# Patient Record
Sex: FEMALE | Race: WHITE | Employment: UNEMPLOYED | URBAN - METROPOLITAN AREA
[De-identification: names, ages, dates, MRNs, and addresses within clinical notes are randomized per-mention and may not be internally consistent; named-entity substitution may affect disease eponyms.]

---

## 2018-09-21 ENCOUNTER — APPOINTMENT (OUTPATIENT)
Dept: GENERAL RADIOLOGY | Age: 83
DRG: 683 | End: 2018-09-21
Attending: EMERGENCY MEDICINE
Payer: MEDICARE

## 2018-09-21 ENCOUNTER — HOSPITAL ENCOUNTER (INPATIENT)
Age: 83
LOS: 5 days | Discharge: NURSING, REHAB OR LONG-TERM CARE | DRG: 683 | End: 2018-09-26
Attending: EMERGENCY MEDICINE | Admitting: INTERNAL MEDICINE
Payer: MEDICARE

## 2018-09-21 DIAGNOSIS — S42.125A CLOSED NONDISPLACED FRACTURE OF LEFT ACROMIAL PROCESS, INITIAL ENCOUNTER: ICD-10-CM

## 2018-09-21 DIAGNOSIS — I87.2 VENOUS STASIS DERMATITIS OF BOTH LOWER EXTREMITIES: ICD-10-CM

## 2018-09-21 DIAGNOSIS — S70.01XA CONTUSION OF RIGHT HIP, INITIAL ENCOUNTER: ICD-10-CM

## 2018-09-21 DIAGNOSIS — N30.01 ACUTE CYSTITIS WITH HEMATURIA: Primary | ICD-10-CM

## 2018-09-21 DIAGNOSIS — N17.9 AKI (ACUTE KIDNEY INJURY) (HCC): ICD-10-CM

## 2018-09-21 DIAGNOSIS — S22.31XA CLOSED FRACTURE OF ONE RIB OF RIGHT SIDE, INITIAL ENCOUNTER: ICD-10-CM

## 2018-09-21 PROBLEM — N39.0 UTI (URINARY TRACT INFECTION): Status: ACTIVE | Noted: 2018-09-21

## 2018-09-21 PROBLEM — S22.39XA RIB FRACTURE: Status: ACTIVE | Noted: 2018-09-21

## 2018-09-21 PROBLEM — S42.123A: Status: ACTIVE | Noted: 2018-09-21

## 2018-09-21 PROBLEM — A41.9 SEPSIS (HCC): Status: ACTIVE | Noted: 2018-09-21

## 2018-09-21 PROBLEM — T81.40XA POST OP INFECTION: Status: RESOLVED | Noted: 2018-09-21 | Resolved: 2018-09-21

## 2018-09-21 PROBLEM — T81.40XA POST OP INFECTION: Status: ACTIVE | Noted: 2018-09-21

## 2018-09-21 LAB
ALBUMIN SERPL-MCNC: 2.7 G/DL (ref 3.4–5)
ALBUMIN/GLOB SERPL: 0.7 {RATIO} (ref 0.8–1.7)
ALP SERPL-CCNC: 95 U/L (ref 45–117)
ALT SERPL-CCNC: 37 U/L (ref 13–56)
ANION GAP SERPL CALC-SCNC: 5 MMOL/L (ref 3–18)
APPEARANCE UR: ABNORMAL
APTT PPP: 24.8 SEC (ref 23–36.4)
AST SERPL-CCNC: 40 U/L (ref 15–37)
BACTERIA URNS QL MICRO: ABNORMAL /HPF
BASOPHILS # BLD: 0 K/UL (ref 0–0.1)
BASOPHILS NFR BLD: 0 % (ref 0–2)
BILIRUB SERPL-MCNC: 0.7 MG/DL (ref 0.2–1)
BILIRUB UR QL: NEGATIVE
BNP SERPL-MCNC: 1000 PG/ML (ref 0–1800)
BUN SERPL-MCNC: 81 MG/DL (ref 7–18)
BUN/CREAT SERPL: 33 (ref 12–20)
CALCIUM SERPL-MCNC: 8.9 MG/DL (ref 8.5–10.1)
CHLORIDE SERPL-SCNC: 95 MMOL/L (ref 100–108)
CK MB CFR SERPL CALC: 1 % (ref 0–4)
CK MB SERPL-MCNC: 3.2 NG/ML (ref 5–25)
CK SERPL-CCNC: 309 U/L (ref 26–192)
CO2 SERPL-SCNC: 34 MMOL/L (ref 21–32)
COLOR UR: YELLOW
CREAT SERPL-MCNC: 2.45 MG/DL (ref 0.6–1.3)
DIFFERENTIAL METHOD BLD: ABNORMAL
EOSINOPHIL # BLD: 0.2 K/UL (ref 0–0.4)
EOSINOPHIL NFR BLD: 2 % (ref 0–5)
EPITH CASTS URNS QL MICRO: ABNORMAL /LPF (ref 0–5)
ERYTHROCYTE [DISTWIDTH] IN BLOOD BY AUTOMATED COUNT: 13.4 % (ref 11.6–14.5)
EST. AVERAGE GLUCOSE BLD GHB EST-MCNC: 232 MG/DL
GLOBULIN SER CALC-MCNC: 4.1 G/DL (ref 2–4)
GLUCOSE BLD STRIP.AUTO-MCNC: 422 MG/DL (ref 70–110)
GLUCOSE BLD STRIP.AUTO-MCNC: 504 MG/DL (ref 70–110)
GLUCOSE SERPL-MCNC: 339 MG/DL (ref 74–99)
GLUCOSE UR STRIP.AUTO-MCNC: NEGATIVE MG/DL
HBA1C MFR BLD: 9.7 % (ref 4.5–5.6)
HCT VFR BLD AUTO: 41.3 % (ref 35–45)
HGB BLD-MCNC: 13.3 G/DL (ref 12–16)
HGB UR QL STRIP: ABNORMAL
INR PPP: 1.1 (ref 0.8–1.2)
KETONES UR QL STRIP.AUTO: NEGATIVE MG/DL
LEUKOCYTE ESTERASE UR QL STRIP.AUTO: ABNORMAL
LIPASE SERPL-CCNC: 165 U/L (ref 73–393)
LYMPHOCYTES # BLD: 1.3 K/UL (ref 0.9–3.6)
LYMPHOCYTES NFR BLD: 12 % (ref 21–52)
MAGNESIUM SERPL-MCNC: 2.5 MG/DL (ref 1.6–2.6)
MAGNESIUM SERPL-MCNC: 2.6 MG/DL (ref 1.6–2.6)
MCH RBC QN AUTO: 29.2 PG (ref 24–34)
MCHC RBC AUTO-ENTMCNC: 32.2 G/DL (ref 31–37)
MCV RBC AUTO: 90.6 FL (ref 74–97)
MONOCYTES # BLD: 1.4 K/UL (ref 0.05–1.2)
MONOCYTES NFR BLD: 14 % (ref 3–10)
NEUTS SEG # BLD: 7.7 K/UL (ref 1.8–8)
NEUTS SEG NFR BLD: 72 % (ref 40–73)
NITRITE UR QL STRIP.AUTO: NEGATIVE
PH UR STRIP: 5 [PH] (ref 5–8)
PHOSPHATE SERPL-MCNC: 3.2 MG/DL (ref 2.5–4.9)
PLATELET # BLD AUTO: 186 K/UL (ref 135–420)
PMV BLD AUTO: 10.5 FL (ref 9.2–11.8)
POTASSIUM SERPL-SCNC: 4.2 MMOL/L (ref 3.5–5.5)
PROT SERPL-MCNC: 6.8 G/DL (ref 6.4–8.2)
PROT UR STRIP-MCNC: 300 MG/DL
PROTHROMBIN TIME: 13.4 SEC (ref 11.5–15.2)
RBC # BLD AUTO: 4.56 M/UL (ref 4.2–5.3)
RBC #/AREA URNS HPF: ABNORMAL /HPF (ref 0–5)
SODIUM SERPL-SCNC: 134 MMOL/L (ref 136–145)
SP GR UR REFRACTOMETRY: 1.02 (ref 1–1.03)
TROPONIN I SERPL-MCNC: 0.02 NG/ML (ref 0–0.06)
UROBILINOGEN UR QL STRIP.AUTO: 0.2 EU/DL (ref 0.2–1)
WBC # BLD AUTO: 10.5 K/UL (ref 4.6–13.2)
WBC URNS QL MICRO: ABNORMAL /HPF (ref 0–5)

## 2018-09-21 PROCEDURE — 87040 BLOOD CULTURE FOR BACTERIA: CPT | Performed by: INTERNAL MEDICINE

## 2018-09-21 PROCEDURE — 87077 CULTURE AEROBIC IDENTIFY: CPT | Performed by: INTERNAL MEDICINE

## 2018-09-21 PROCEDURE — 96375 TX/PRO/DX INJ NEW DRUG ADDON: CPT

## 2018-09-21 PROCEDURE — 85730 THROMBOPLASTIN TIME PARTIAL: CPT | Performed by: EMERGENCY MEDICINE

## 2018-09-21 PROCEDURE — 82550 ASSAY OF CK (CPK): CPT | Performed by: EMERGENCY MEDICINE

## 2018-09-21 PROCEDURE — 85025 COMPLETE CBC W/AUTO DIFF WBC: CPT | Performed by: EMERGENCY MEDICINE

## 2018-09-21 PROCEDURE — 74011000250 HC RX REV CODE- 250: Performed by: INTERNAL MEDICINE

## 2018-09-21 PROCEDURE — 73030 X-RAY EXAM OF SHOULDER: CPT

## 2018-09-21 PROCEDURE — 80053 COMPREHEN METABOLIC PANEL: CPT | Performed by: EMERGENCY MEDICINE

## 2018-09-21 PROCEDURE — 83690 ASSAY OF LIPASE: CPT | Performed by: EMERGENCY MEDICINE

## 2018-09-21 PROCEDURE — 87186 SC STD MICRODIL/AGAR DIL: CPT | Performed by: INTERNAL MEDICINE

## 2018-09-21 PROCEDURE — 73502 X-RAY EXAM HIP UNI 2-3 VIEWS: CPT

## 2018-09-21 PROCEDURE — 74011000250 HC RX REV CODE- 250: Performed by: EMERGENCY MEDICINE

## 2018-09-21 PROCEDURE — 82962 GLUCOSE BLOOD TEST: CPT

## 2018-09-21 PROCEDURE — 74011250637 HC RX REV CODE- 250/637: Performed by: INTERNAL MEDICINE

## 2018-09-21 PROCEDURE — 71111 X-RAY EXAM RIBS/CHEST4/> VWS: CPT

## 2018-09-21 PROCEDURE — 83880 ASSAY OF NATRIURETIC PEPTIDE: CPT | Performed by: EMERGENCY MEDICINE

## 2018-09-21 PROCEDURE — 85610 PROTHROMBIN TIME: CPT | Performed by: EMERGENCY MEDICINE

## 2018-09-21 PROCEDURE — 65270000029 HC RM PRIVATE

## 2018-09-21 PROCEDURE — 83036 HEMOGLOBIN GLYCOSYLATED A1C: CPT | Performed by: INTERNAL MEDICINE

## 2018-09-21 PROCEDURE — 99285 EMERGENCY DEPT VISIT HI MDM: CPT

## 2018-09-21 PROCEDURE — 74011250636 HC RX REV CODE- 250/636: Performed by: EMERGENCY MEDICINE

## 2018-09-21 PROCEDURE — 83735 ASSAY OF MAGNESIUM: CPT | Performed by: INTERNAL MEDICINE

## 2018-09-21 PROCEDURE — 77030011943

## 2018-09-21 PROCEDURE — 83735 ASSAY OF MAGNESIUM: CPT | Performed by: EMERGENCY MEDICINE

## 2018-09-21 PROCEDURE — 84100 ASSAY OF PHOSPHORUS: CPT | Performed by: INTERNAL MEDICINE

## 2018-09-21 PROCEDURE — 87086 URINE CULTURE/COLONY COUNT: CPT | Performed by: INTERNAL MEDICINE

## 2018-09-21 PROCEDURE — 81001 URINALYSIS AUTO W/SCOPE: CPT | Performed by: EMERGENCY MEDICINE

## 2018-09-21 PROCEDURE — 93005 ELECTROCARDIOGRAM TRACING: CPT

## 2018-09-21 PROCEDURE — 74011250636 HC RX REV CODE- 250/636: Performed by: INTERNAL MEDICINE

## 2018-09-21 PROCEDURE — 96374 THER/PROPH/DIAG INJ IV PUSH: CPT

## 2018-09-21 PROCEDURE — 74011636637 HC RX REV CODE- 636/637: Performed by: INTERNAL MEDICINE

## 2018-09-21 RX ORDER — HYDROCHLOROTHIAZIDE 12.5 MG/1
25 TABLET ORAL DAILY
COMMUNITY
End: 2018-09-26

## 2018-09-21 RX ORDER — LABETALOL HYDROCHLORIDE 5 MG/ML
10 INJECTION, SOLUTION INTRAVENOUS
Status: DISCONTINUED | OUTPATIENT
Start: 2018-09-21 | End: 2018-09-26 | Stop reason: HOSPADM

## 2018-09-21 RX ORDER — HYDRALAZINE HYDROCHLORIDE 10 MG/1
TABLET, FILM COATED ORAL
Status: ON HOLD | COMMUNITY
End: 2018-09-25

## 2018-09-21 RX ORDER — HEPARIN SODIUM 5000 [USP'U]/ML
5000 INJECTION, SOLUTION INTRAVENOUS; SUBCUTANEOUS EVERY 8 HOURS
Status: DISCONTINUED | OUTPATIENT
Start: 2018-09-21 | End: 2018-09-26 | Stop reason: HOSPADM

## 2018-09-21 RX ORDER — MAGNESIUM SULFATE 100 %
4 CRYSTALS MISCELLANEOUS AS NEEDED
Status: DISCONTINUED | OUTPATIENT
Start: 2018-09-21 | End: 2018-09-26 | Stop reason: HOSPADM

## 2018-09-21 RX ORDER — CLONIDINE 0.2 MG/24H
1 PATCH, EXTENDED RELEASE TRANSDERMAL
Status: ON HOLD | COMMUNITY
End: 2018-09-25

## 2018-09-21 RX ORDER — INSULIN LISPRO 100 [IU]/ML
INJECTION, SOLUTION INTRAVENOUS; SUBCUTANEOUS
Status: DISCONTINUED | OUTPATIENT
Start: 2018-09-21 | End: 2018-09-26 | Stop reason: HOSPADM

## 2018-09-21 RX ORDER — HYDRALAZINE HYDROCHLORIDE 20 MG/ML
10 INJECTION INTRAMUSCULAR; INTRAVENOUS
Status: COMPLETED | OUTPATIENT
Start: 2018-09-21 | End: 2018-09-21

## 2018-09-21 RX ORDER — MORPHINE SULFATE 2 MG/ML
2 INJECTION, SOLUTION INTRAMUSCULAR; INTRAVENOUS
Status: DISCONTINUED | OUTPATIENT
Start: 2018-09-21 | End: 2018-09-26 | Stop reason: HOSPADM

## 2018-09-21 RX ORDER — ATORVASTATIN CALCIUM 10 MG/1
TABLET, FILM COATED ORAL DAILY
COMMUNITY

## 2018-09-21 RX ORDER — ONDANSETRON 2 MG/ML
4 INJECTION INTRAMUSCULAR; INTRAVENOUS
Status: DISCONTINUED | OUTPATIENT
Start: 2018-09-21 | End: 2018-09-26 | Stop reason: HOSPADM

## 2018-09-21 RX ORDER — FUROSEMIDE 10 MG/ML
40 INJECTION INTRAMUSCULAR; INTRAVENOUS
Status: COMPLETED | OUTPATIENT
Start: 2018-09-21 | End: 2018-09-21

## 2018-09-21 RX ORDER — HYDRALAZINE HYDROCHLORIDE 20 MG/ML
20 INJECTION INTRAMUSCULAR; INTRAVENOUS
Status: COMPLETED | OUTPATIENT
Start: 2018-09-21 | End: 2018-09-21

## 2018-09-21 RX ORDER — METOPROLOL SUCCINATE 50 MG/1
50 TABLET, EXTENDED RELEASE ORAL DAILY
Status: DISCONTINUED | OUTPATIENT
Start: 2018-09-22 | End: 2018-09-26 | Stop reason: HOSPADM

## 2018-09-21 RX ORDER — HYDRALAZINE HYDROCHLORIDE 10 MG/1
10 TABLET, FILM COATED ORAL 3 TIMES DAILY
Status: DISCONTINUED | OUTPATIENT
Start: 2018-09-21 | End: 2018-09-22

## 2018-09-21 RX ORDER — FUROSEMIDE 20 MG/1
20 TABLET ORAL DAILY
COMMUNITY

## 2018-09-21 RX ORDER — SODIUM CHLORIDE 9 MG/ML
100 INJECTION, SOLUTION INTRAVENOUS CONTINUOUS
Status: DISPENSED | OUTPATIENT
Start: 2018-09-21 | End: 2018-09-22

## 2018-09-21 RX ORDER — ACETAMINOPHEN 325 MG/1
650 TABLET ORAL 4 TIMES DAILY
Status: DISCONTINUED | OUTPATIENT
Start: 2018-09-21 | End: 2018-09-26 | Stop reason: HOSPADM

## 2018-09-21 RX ORDER — TRAMADOL HYDROCHLORIDE 50 MG/1
50 TABLET ORAL
Status: DISCONTINUED | OUTPATIENT
Start: 2018-09-21 | End: 2018-09-24

## 2018-09-21 RX ORDER — DEXTROSE 50 % IN WATER (D50W) INTRAVENOUS SYRINGE
25-50 AS NEEDED
Status: DISCONTINUED | OUTPATIENT
Start: 2018-09-21 | End: 2018-09-26 | Stop reason: HOSPADM

## 2018-09-21 RX ORDER — CLONIDINE 0.2 MG/24H
1 PATCH, EXTENDED RELEASE TRANSDERMAL
Status: DISCONTINUED | OUTPATIENT
Start: 2018-09-22 | End: 2018-09-26 | Stop reason: HOSPADM

## 2018-09-21 RX ORDER — METOPROLOL SUCCINATE 100 MG/1
50 TABLET, EXTENDED RELEASE ORAL DAILY
COMMUNITY

## 2018-09-21 RX ADMIN — HYDRALAZINE HYDROCHLORIDE 10 MG: 20 INJECTION INTRAMUSCULAR; INTRAVENOUS at 12:45

## 2018-09-21 RX ADMIN — HEPARIN SODIUM 5000 UNITS: 5000 INJECTION INTRAVENOUS; SUBCUTANEOUS at 17:49

## 2018-09-21 RX ADMIN — MORPHINE SULFATE 2 MG: 2 INJECTION, SOLUTION INTRAMUSCULAR; INTRAVENOUS at 17:49

## 2018-09-21 RX ADMIN — CEFTRIAXONE 1 G: 1 INJECTION, POWDER, FOR SOLUTION INTRAMUSCULAR; INTRAVENOUS at 17:48

## 2018-09-21 RX ADMIN — HYDRALAZINE HYDROCHLORIDE 10 MG: 10 TABLET, FILM COATED ORAL at 22:49

## 2018-09-21 RX ADMIN — HYDRALAZINE HYDROCHLORIDE 20 MG: 20 INJECTION INTRAMUSCULAR; INTRAVENOUS at 15:27

## 2018-09-21 RX ADMIN — SODIUM CHLORIDE 100 ML/HR: 900 INJECTION, SOLUTION INTRAVENOUS at 17:47

## 2018-09-21 RX ADMIN — INSULIN LISPRO 10 UNITS: 100 INJECTION, SOLUTION INTRAVENOUS; SUBCUTANEOUS at 22:44

## 2018-09-21 RX ADMIN — WATER 2 G: 1 INJECTION INTRAMUSCULAR; INTRAVENOUS; SUBCUTANEOUS at 15:27

## 2018-09-21 RX ADMIN — FUROSEMIDE 40 MG: 10 INJECTION, SOLUTION INTRAMUSCULAR; INTRAVENOUS at 12:45

## 2018-09-21 RX ADMIN — ACETAMINOPHEN 650 MG: 325 TABLET ORAL at 22:50

## 2018-09-21 RX ADMIN — HYDRALAZINE HYDROCHLORIDE 10 MG: 10 TABLET, FILM COATED ORAL at 17:48

## 2018-09-21 RX ADMIN — ACETAMINOPHEN 650 MG: 325 TABLET ORAL at 17:48

## 2018-09-21 NOTE — ED PROVIDER NOTES
EMERGENCY DEPARTMENT HISTORY AND PHYSICAL EXAM 
 
Date: 9/21/2018 Patient Name: Kendrick Estrella History of Presenting Illness Chief Complaint Patient presents with  Fall History Provided By: Patient and EMS Chief Complaint: Injuries and pain secondary to fall Duration: 2 Days Timing:  Acute Location: Buttocks and right-sided \"ribs\" Modifying Factors: No relieving or worsening factors Associated Symptoms:  myalgias (buttocks pain), arthralgias, bilateral leg swelling \"rib pain\" upon inspiration. Additional History (Context):  
11:44 AM 
Kendrick Estrella is a 80 y.o. female with PMHX of HTN, DM who presents to the emergency department C/O injuries and pain secondary to fall that occurred two days ago. Associated sxs include myalgias (buttocks pain), arthralgias, bilateral leg swelling \"rib pain\" upon inspiration. Per EMS the pt fell on her right side two days ago but declined to go to the emergency room. Since the fall she has been having associated the myalgias and arthralgias. Pt lives at [facility] living. Pt denies any other sxs or complaints. PCP: Mandie Koenig MD 
 
 
 
Past History Past Medical History: 
Past Medical History:  
Diagnosis Date  Diabetes ([address]  Hypertension Past Surgical History: 
Past Surgical History:  
Procedure Laterality Date  HX KNEE REPLACEMENT    
 bilateral  
 
 
Family History: 
History reviewed. No pertinent family history. Social History: 
Social History Substance Use Topics  Smoking status: Never Smoker  Smokeless tobacco: Never Used  Alcohol use No  
   Comment: former Allergies: Allergies Allergen Reactions  Codeine Vertigo Review of Systems Review of Systems Cardiovascular: Positive for leg swelling. Musculoskeletal: Positive for arthralgias and myalgias (buttocks pain). All other systems reviewed and are negative. Physical Exam  
 
Vitals: 09/21/18 1250 09/21/18 1300 09/21/18 1430 09/21/18 1500 BP: 199/46 184/45 (!) 215/48 (!) 205/48 Pulse: 76 74 83 81 Resp: 23 24 22 19 Temp:      
SpO2: 94% 95% 95% 94% Weight:      
Height:      
 
Physical Exam 
Vital signs and nursing notes reviewed CONSTITUTIONAL: Alert, in mild acute distress HEAD:  Normocephalic, atraumatic EYES: PERRL; EOM's intact. ENTM: Nose: no rhinorrhea; Throat: no erythema or exudate, mucous membranes moist 
Neck:  No JVD, supple without lymphadenopathy RESP: Chest clear, equal breath sounds. CV: S1 and S2 WNL; No murmurs, gallops or rubs. GI: Normal bowel sounds, abdomen soft and non-tender. No masses or organomegaly. : No costo-vertebral angle tenderness. BACK:  Non-tender UPPER EXT: Left shoulder bruise posteriorly LOWER EXT: Tenderness in right hip laterally, two sacral decubitus ulcers, +3/4 edema to the bilateral knee with open sores on bilateral legs. NEURO: CN intact, reflexes 2/4 and sym, strength 5/5 and sym, sensation intact. SKIN: No rashes; Normal for age and stage. PSYCH:  Alert and oriented, normal affect. Diagnostic Study Results Labs - Recent Results (from the past 12 hour(s)) URINALYSIS W/ RFLX MICROSCOPIC Collection Time: 09/21/18 11:35 AM  
Result Value Ref Range Color YELLOW Appearance TURBID Specific gravity 1.017 1.005 - 1.030    
 pH (UA) 5.0 5.0 - 8.0 Protein 300 (A) NEG mg/dL Glucose NEGATIVE  NEG mg/dL Ketone NEGATIVE  NEG mg/dL Bilirubin NEGATIVE  NEG Blood LARGE (A) NEG Urobilinogen 0.2 0.2 - 1.0 EU/dL Nitrites NEGATIVE  NEG Leukocyte Esterase LARGE (A) NEG URINE MICROSCOPIC ONLY Collection Time: 09/21/18 11:35 AM  
Result Value Ref Range WBC TOO NUMEROUS TO COUNT 0 - 5 /hpf  
 RBC 4 to 10 0 - 5 /hpf Epithelial cells FEW 0 - 5 /lpf Bacteria FEW (A) NEG /hpf  
CBC WITH AUTOMATED DIFF Collection Time: 09/21/18 11:44 AM  
Result Value Ref Range WBC 10.5 4.6 - 13.2 K/uL  
 RBC 4.56 4.20 - 5.30 M/uL  
 HGB 13.3 12.0 - 16.0 g/dL HCT 41.3 35.0 - 45.0 % MCV 90.6 74.0 - 97.0 FL  
 MCH 29.2 24.0 - 34.0 PG  
 MCHC 32.2 31.0 - 37.0 g/dL  
 RDW 13.4 11.6 - 14.5 % PLATELET 802 761 - 649 K/uL MPV 10.5 9.2 - 11.8 FL  
 NEUTROPHILS 72 40 - 73 % LYMPHOCYTES 12 (L) 21 - 52 % MONOCYTES 14 (H) 3 - 10 % EOSINOPHILS 2 0 - 5 % BASOPHILS 0 0 - 2 %  
 ABS. NEUTROPHILS 7.7 1.8 - 8.0 K/UL  
 ABS. LYMPHOCYTES 1.3 0.9 - 3.6 K/UL  
 ABS. MONOCYTES 1.4 (H) 0.05 - 1.2 K/UL  
 ABS. EOSINOPHILS 0.2 0.0 - 0.4 K/UL  
 ABS. BASOPHILS 0.0 0.0 - 0.1 K/UL  
 DF AUTOMATED METABOLIC PANEL, COMPREHENSIVE Collection Time: 09/21/18 11:44 AM  
Result Value Ref Range Sodium 134 (L) 136 - 145 mmol/L Potassium 4.2 3.5 - 5.5 mmol/L Chloride 95 (L) 100 - 108 mmol/L  
 CO2 34 (H) 21 - 32 mmol/L Anion gap 5 3.0 - 18 mmol/L Glucose 339 (H) 74 - 99 mg/dL BUN 81 (H) 7.0 - 18 MG/DL Creatinine 2.45 (H) 0.6 - 1.3 MG/DL  
 BUN/Creatinine ratio 33 (H) 12 - 20 GFR est AA 22 (L) >60 ml/min/1.73m2 GFR est non-AA 18 (L) >60 ml/min/1.73m2 Calcium 8.9 8.5 - 10.1 MG/DL Bilirubin, total 0.7 0.2 - 1.0 MG/DL  
 ALT (SGPT) 37 13 - 56 U/L  
 AST (SGOT) 40 (H) 15 - 37 U/L Alk. phosphatase 95 45 - 117 U/L Protein, total 6.8 6.4 - 8.2 g/dL Albumin 2.7 (L) 3.4 - 5.0 g/dL Globulin 4.1 (H) 2.0 - 4.0 g/dL A-G Ratio 0.7 (L) 0.8 - 1.7 LIPASE Collection Time: 09/21/18 11:44 AM  
Result Value Ref Range Lipase 165 73 - 393 U/L  
PROTHROMBIN TIME + INR Collection Time: 09/21/18 11:44 AM  
Result Value Ref Range Prothrombin time 13.4 11.5 - 15.2 sec INR 1.1 0.8 - 1.2 PTT Collection Time: 09/21/18 11:44 AM  
Result Value Ref Range aPTT 24.8 23.0 - 36.4 SEC CARDIAC PANEL,(CK, CKMB & TROPONIN) Collection Time: 09/21/18 11:44 AM  
Result Value Ref Range  (H) 26 - 192 U/L  
 CK - MB 3.2 <3.6 ng/ml CK-MB Index 1.0 0.0 - 4.0 % Troponin-I, Qt. 0.02 0.00 - 0.06 NG/ML  
MAGNESIUM Collection Time: 09/21/18 11:44 AM  
Result Value Ref Range Magnesium 2.5 1.6 - 2.6 mg/dL NT-PRO BNP Collection Time: 09/21/18 11:44 AM  
Result Value Ref Range NT pro-BNP 1000 0 - 1800 PG/ML  
EKG, 12 LEAD, INITIAL Collection Time: 09/21/18 11:50 AM  
Result Value Ref Range Ventricular Rate 74 BPM  
 Atrial Rate 74 BPM  
 P-R Interval 170 ms QRS Duration 100 ms Q-T Interval 406 ms QTC Calculation (Bezet) 450 ms Calculated P Axis 66 degrees Calculated R Axis -23 degrees Calculated T Axis 73 degrees Diagnosis Normal sinus rhythm Normal ECG No previous ECGs available Radiologic Studies -  
XR SHOULDER LT AP/LAT MIN 2 V Final Result IMPRESSION: 
1. Potential nondisplaced fracture of the left shoulder acromial process. 
  
2. No acute glenohumeral malalignment. 
  
3. Moderately severe glenohumeral and acromioclavicular joint osteoarthritis.  
   
 
As read by the radiologist.  
XR RIBS BI W PA CHEST 4 VS  
Final Result XR HIP RT W OR WO PELV 2-3 VWS Final Result IMPRESSION: 
1. Moderate severity bilateral hip joint osteoarthritis without evidence of 
fracture or acute malalignment involving the right hip.  
   
 
As read by the radiologist.  
 
CT Results  (Last 48 hours) None CXR Results  (Last 48 hours) 09/21/18 1405  XR RIBS BI W PA CHEST 4 VS Final result Impression:  IMPRESSION:  
1. Nondisplaced anterolateral right seventh rib fracture. No evidence of  
displaced rib fracture. 2. Mildly underexpanded lungs without superimposed acute radiographic  
cardiopulmonary abnormality. Narrative:  Examination: Ribs bilateral with PA chest.  
   
HISTORY: Fall, right-sided rib pain.   
   
FINDINGS: AP supine projection of the chest as well as AP and oblique views of  
 each ribs are performed and interpreted without comparison. The lungs are mildly  
underexpanded. No focal pneumonic opacity. No pneumothorax or pleural effusion. Cardiac size is mildly enlarged. Mediastinal contours appear within normal  
limits. There is a nondisplaced fracture of the anterolateral right seventh rib. No displaced right or left rib fracture is present. Multilevel thoracic and  
lumbar spondylosis noted. Medications given in the ED- Medications  
cefepime (MAXIPIME) 2 g in sterile water (preservative free) 10 mL IV syringe (not administered)  
furosemide (LASIX) injection 40 mg (40 mg IntraVENous Given 9/21/18 1245) hydrALAZINE (APRESOLINE) 20 mg/mL injection 10 mg (10 mg IntraVENous Given 9/21/18 1245) Medical Decision Making I am the first provider for this patient. I reviewed the vital signs, available nursing notes, past medical history, past surgical history, family history and social history. Vital Signs-Reviewed the patient's vital signs. Pulse Oximetry Analysis - 97% on room air Cardiac Monitor: 
Rate: 74 bpm 
Rhythm: NSR 
 
EKG interpretation: (Preliminary) 74 bpm, NSR, no STEMI 
EKG read by Vel Bland MD at 11:58 PM  
 
Records Reviewed: Nursing Notes Per documentation [facility], on 08/02/2018 the pt's creatinine was recorded as 1.3 Provider Notes (Medical Decision Making):  
Ddx: Fall, sprain, strain, fracture, dislocation, UTI Procedures: 
Procedures ED Course:  
11:44 AM Initial assessment performed. The patients presenting problems have been discussed, and they are in agreement with the care plan formulated and outlined with them. I have encouraged them to ask questions as they arise throughout their visit. 3:13 PM Discussed patient's history, exam, and available diagnostics results with Hector Turcios DO, internal medicine, who agree to admit the patient to medical. 
 
 
Diagnosis and Disposition Core Measures: 
For Hospitalized Patients: 
 
1. Hospitalization Decision Time: The decision to hospitalize the patient was made by Monisha Lopez MD at 3:00 PM on 9/21/2018 2. Aspirin: Aspirin was not given because the patient did not present with a stroke at the time of their Emergency Department evaluation 3:15 PM  Patient is being admitted to the hospital by Jayjay Cartwright DO. The results of their tests and reasons for their admission have been discussed with them and/or available family. They convey agreement and understanding for the need to be admitted and for their admission diagnosis. CONDITIONS ON ADMISSION: 
Sepsis is not present at the time of admission. Deep Vein Thrombosis is not present at the time of admission. Thrombosis is not present at the time of admission. Urinary Tract Infection is present at the time of admission. Pneumonia is not present at the time of admission. MRSA is not present at the time of admission. Wound infection is present at the time of admission. Pressure Ulcer is not present at the time of admission. CLINICAL IMPRESSION: 
 
1. Acute cystitis with hematuria 2. VINNY (acute kidney injury) ([facility] [address] 3. Closed fracture of one rib of right side, initial encounter 4. Contusion of right hip, initial encounter 5. Closed nondisplaced fracture of left acromial process, initial encounter 6. Venous stasis dermatitis of both lower extremities PLAN: 
1. Admit to medical 
_______________________________ Attestations: This note is prepared by Dylon Littlejohn, acting as Scribe for Monisha Lopez MD. 
 
Monisha Lopez MD:  The scribe's documentation has been prepared under my direction and personally reviewed by me in its entirety. I confirm that the note above accurately reflects all work, treatment, procedures, and medical decision making performed by me. 
_______________________________

## 2018-09-21 NOTE — ED NOTES
Pt BP been trending up. Provider notified, followed up with medications. Pt alert and resting on stretcher, call bell in reach and bed in low position. Pt requested something to whet her mouth. Will notify provider. Will Pt expresses no further needs, will continue to monitor.

## 2018-09-21 NOTE — ROUTINE PROCESS
TRANSFER - OUT REPORT: 
 
Verbal report given to Nadia Koo RN(name) on Kendrick Estrella  being transferred to [facility](unit) for routine progression of care Report consisted of patients Situation, Background, Assessment and  
Recommendations(SBAR). Information from the following report(s) ED Summary was reviewed with the receiving nurse. Lines:  
Peripheral IV 09/21/18 Left Antecubital (Active) Site Assessment Clean, dry, & intact 9/21/2018 11:43 AM  
Phlebitis Assessment 0 9/21/2018 11:43 AM  
Infiltration Assessment 0 9/21/2018 11:43 AM  
Dressing Status Clean, dry, & intact 9/21/2018 11:43 AM  
Dressing Type Transparent 9/21/2018 11:43 AM  
Hub Color/Line Status Pink;Patent; Flushed 9/21/2018 11:43 AM  
Action Taken Blood drawn 9/21/2018 11:43 AM  
   
Peripheral IV (Active) Opportunity for questions and clarification was provided. Patient transported with: 
 Hospital Staff

## 2018-09-21 NOTE — PROGRESS NOTES
Received patient via stretcher to room 304, with her two daughters. Patient is A&O x 4 and able to verbalize needs. Physical assessment completed with noted redden paula-area with openings to gluteal folds. Patient was bathed and protective cream applied to area. Patient has a clean and intact dressing applied to area. Bruise to right side of chin. Right knee with noted abrasion. Skin is dry, warm and flaky. Lotion applied after bath. Patient c/o pain and discomfort to lower back and rated the pain as a 6/10 on the numeric pain scale. PRN Morphine 2 mg IV was given per MAR. Patient call bell and side table within reach. Will continue to monitor.

## 2018-09-21 NOTE — ED TRIAGE NOTES
Pt brought in via EMS. Pt had a fall x2 days ago. Pt c/o pain to the rib when she coughs and pain to the buttocks.

## 2018-09-21 NOTE — Clinical Note
Status[de-identified] Inpatient [101] Type of Bed: Telemetry [19] Inpatient Hospitalization Certified Necessary for the Following Reasons: 3. Patient receiving treatment that can only be provided in an inpatient setting (further clarification in H&P documentation) Admitting Diagnosis: Sepsis ([address] [6653716] Admitting Diagnosis: Post op infection [[id number]] Admitting Diagnosis: UTI (urinary tract infection) [[id number]] Admitting Physician: Chucky Paz Attending Physician: Chucky Paz Estimated Length of Stay: 2 Midnights Discharge Plan[de-identified] Home with Office Follow-up

## 2018-09-21 NOTE — ED NOTES
Pt alert and resting on stretcher, call bell in reach and bed in low position. Physician in room. Will follow up with orders. Will continue to monitor.

## 2018-09-21 NOTE — H&P
History & Physical 
 
Patient: Kendrick Estrella MRN: [id number]  CSN: [id number] YOB: 1925  Age: 80 y.o. Sex: female DOA: 9/21/2018 Primary Care Provider:  Vasquez Maldonado MD 
 
 
Assessment/Plan 1. VINNY 2. UTI 3. HTN  urgency 4. Nondisplaced rib fracture 5. Left acromial process fracture 6. DM2 
7. Venous stasis ulceration 8.stage 2 pressure ulceration of buttocks PLAN: 
- Admit to medical service with telemetry monitoring - IV fluids 
- culture urine, blood 
- start ceftriaxone IV daily 
- hold glipizide, lasix, HCTZ 
- cont catapress, metoprolol, hydralazine, [place] and [place] - IV labetalol prn SBP> 180 
- monitor accuchecks and utilize correction insulin as requierd 
- start scheduled tylenol, tramadol & morhpine prn pain 
- mobilize w PT/OT 
- lenghty discussion surrounding code status, elects DNR 
- dvt ppx heparin Patient Active Problem List  
Diagnosis Code  UTI (urinary tract infection) N39.0  Sepsis ([facility] [address] A41.9  VINNY (acute kidney injury) ([facility] [address] N17.9  Rib fracture S22.39XA  Fracture of acromial process S42.123A  
 
HPI:  
CC:\" I fell\" Kendrick Estrella is a 80 y.o. female with past medical history significant for Dm2, HTN , OA& hyperlipidemia presents to the ER after sustaining a fall and being unable to get up from the floor and was on the ground x 12 hours. She denies dizziness, light headedness, LOC and her family reports she is instructed to use RW but has not been doing so. She fell on her R side and complains of pain in her chest wall, shoulder and hip. She denies nausea, vomiting or headache. She reports her pain is 10/10, worse w movement, better w analgesia, assocaited w weakness. ON presentation to the ER she was afebrile, hypertensive w systolic readings as high as 224, with out hyopxia. Exam revealed TTP R chest wall, and brusiing to posterior aspect of her shoulder.  She has stage 2 pressure ulcerations. Labs demonstrate VINNY, Xrays w fx to ribs and acromial process. Hip without fx. Medicine is asked to admit for further management. Past Medical History:  
Diagnosis Date  Diabetes ([address]  Hypertension Past Surgical History:  
Procedure Laterality Date  HX KNEE REPLACEMENT    
 bilateral  
  
Social History Substance Use Topics  Smoking status: Never Smoker  Smokeless tobacco: Never Used  Alcohol use No  
   Comment: former History reviewed. No pertinent family history. No current facility-administered medications on file prior to encounter. No current outpatient prescriptions on file prior to encounter. Allergies Allergen Reactions  Codeine Vertigo Review of Systems Constitutional: No fever, chills, diaphoresis, malaise, fatigue + falls Skin: no itching or rashes HEENT: no neck stiffness, hearing loss, tinnitus, epistaxis or sore throat EYES: no blurry vision, double vision or photophobia CARDIOVASCULAR: no, cp, palpitations, orthopnea, pnd or LE edema PULMONARY: no cough, wheeze, shortness of breath or sputum production GI: no nausea, vomiting, diarrhea, abdominal pain, melena, hematemesis or brbpr : no dysuria, hematuria MUSCULOSKELETAL: + back pain,+joint pain + myalgias ENDOCRINE: no heat/cold intolerance, polyuria or polydipsia HEME: no easy bruising or bleeding NEURO: no unilateral weakness, numbness, tingling or seizures Physical Exam:  
  
 
Visit Vitals  /41  Pulse 92  Temp 97.5 °F (36.4 °C)  Resp 23  
 Ht 5' 1\" (1.549 m)  Wt 78 kg (172 lb)  SpO2 94%  BMI 32.5 kg/m2 O2 Device: Room air Temp (24hrs), Av.5 °F (36.4 °C), Min:97.5 °F (36.4 °C), Max:97.5 °F (36.4 °C) Body mass index is 32.5 kg/(m^2). General:  Awake, cooperative, no distress, elderly female Head:  Normocephalic, without obvious abnormality, atraumatic. Eyes:  Conjunctivae/corneas clear, sclera anicteric, PERRL, EOMs intact. Nose: Nares normal. No drainage or sinus tenderness. hroat: Lips, mucosa, and tongue normal. .  
Neck: Supple, symmetrical, trachea midline, no adenopathy. Lungs:   Clear to auscultation bilaterally, equal expansion Heart:  Regular rate and rhythm, S1, S2 normal, no murmur, click, rub or gallop, cap refill normal   
  Abdomen: Soft, non-tender. Bowel sounds normal. No masses,  No organomegaly. Extremities: Venous stasis chagnes bilaterally w ulcerations present Pulses: 2+ and symmetric all extremities. Skin: Skin color pale, texture, turgor normal. No rashes or lesions + ecchymosis shoulder Neurologic: CNII-XII intact. No focal motor or sensory deficit. Laboratory Studies: 
 
CMP:  
Lab Results Component Value Date/Time  (L) 09/21/2018 11:44 AM  
 K 4.2 09/21/2018 11:44 AM  
 CL 95 (L) 09/21/2018 11:44 AM  
 CO2 34 (H) 09/21/2018 11:44 AM  
 AGAP 5 09/21/2018 11:44 AM  
  (H) 09/21/2018 11:44 AM  
 BUN 81 (H) 09/21/2018 11:44 AM  
 CREA 2.45 (H) 09/21/2018 11:44 AM  
 GFRAA 22 (L) 09/21/2018 11:44 AM  
 GFRNA 18 (L) 09/21/2018 11:44 AM  
 CA 8.9 09/21/2018 11:44 AM  
 MG 2.5 09/21/2018 11:44 AM  
 ALB 2.7 (L) 09/21/2018 11:44 AM  
 TP 6.8 09/21/2018 11:44 AM  
 GLOB 4.1 (H) 09/21/2018 11:44 AM  
 AGRAT 0.7 (L) 09/21/2018 11:44 AM  
 SGOT 40 (H) 09/21/2018 11:44 AM  
 ALT 37 09/21/2018 11:44 AM  
 
CBC:  
Lab Results Component Value Date/Time WBC 10.5 09/21/2018 11:44 AM  
 HGB 13.3 09/21/2018 11:44 AM  
 HCT 41.3 09/21/2018 11:44 AM  
  09/21/2018 11:44 AM  
 
 
Imaging studies personally reviewed: 
Xray shoulder :\" IMPRESSION: 
1. Potential nondisplaced fracture of the left shoulder acromial process. 
  
2. No acute glenohumeral malalignment. 
  
3. Moderately severe glenohumeral and acromioclavicular joint osteoarthritis. \"  
   
 
Rib series:\" IMPRESSION: 
 1. Nondisplaced anterolateral right seventh rib fracture. No evidence of 
displaced rib fracture. 
  
2. Mildly underexpanded lungs without superimposed acute radiographic 
cardiopulmonary abnormality. \" Xray hip:\" 1. Moderate severity bilateral hip joint osteoarthritis without evidence of 
fracture or acute malalignment involving the right hip. \"  
   
 
ACP: time spent discussing advance care planning (code status) 17 minutes aside exam 
 
 
Sandro Stallworth,  Internal Medicine/Geriatrics

## 2018-09-22 LAB
ANION GAP SERPL CALC-SCNC: 8 MMOL/L (ref 3–18)
BASOPHILS # BLD: 0 K/UL (ref 0–0.1)
BASOPHILS NFR BLD: 0 % (ref 0–2)
BUN SERPL-MCNC: 77 MG/DL (ref 7–18)
BUN/CREAT SERPL: 32 (ref 12–20)
CALCIUM SERPL-MCNC: 8.3 MG/DL (ref 8.5–10.1)
CHLORIDE SERPL-SCNC: 98 MMOL/L (ref 100–108)
CK SERPL-CCNC: 152 U/L (ref 26–192)
CO2 SERPL-SCNC: 31 MMOL/L (ref 21–32)
CREAT SERPL-MCNC: 2.4 MG/DL (ref 0.6–1.3)
DIFFERENTIAL METHOD BLD: ABNORMAL
EOSINOPHIL # BLD: 0.2 K/UL (ref 0–0.4)
EOSINOPHIL NFR BLD: 2 % (ref 0–5)
ERYTHROCYTE [DISTWIDTH] IN BLOOD BY AUTOMATED COUNT: 13.4 % (ref 11.6–14.5)
GLUCOSE BLD STRIP.AUTO-MCNC: 158 MG/DL (ref 70–110)
GLUCOSE BLD STRIP.AUTO-MCNC: 224 MG/DL (ref 70–110)
GLUCOSE BLD STRIP.AUTO-MCNC: 228 MG/DL (ref 70–110)
GLUCOSE BLD STRIP.AUTO-MCNC: 232 MG/DL (ref 70–110)
GLUCOSE BLD STRIP.AUTO-MCNC: 360 MG/DL (ref 70–110)
GLUCOSE SERPL-MCNC: 142 MG/DL (ref 74–99)
HCT VFR BLD AUTO: 37.7 % (ref 35–45)
HGB BLD-MCNC: 12.1 G/DL (ref 12–16)
LYMPHOCYTES # BLD: 2.2 K/UL (ref 0.9–3.6)
LYMPHOCYTES NFR BLD: 19 % (ref 21–52)
MCH RBC QN AUTO: 29.1 PG (ref 24–34)
MCHC RBC AUTO-ENTMCNC: 32.1 G/DL (ref 31–37)
MCV RBC AUTO: 90.6 FL (ref 74–97)
MONOCYTES # BLD: 1.2 K/UL (ref 0.05–1.2)
MONOCYTES NFR BLD: 11 % (ref 3–10)
NEUTS SEG # BLD: 8 K/UL (ref 1.8–8)
NEUTS SEG NFR BLD: 68 % (ref 40–73)
PLATELET # BLD AUTO: 201 K/UL (ref 135–420)
PMV BLD AUTO: 10.3 FL (ref 9.2–11.8)
POTASSIUM SERPL-SCNC: 3.4 MMOL/L (ref 3.5–5.5)
RBC # BLD AUTO: 4.16 M/UL (ref 4.2–5.3)
SODIUM SERPL-SCNC: 137 MMOL/L (ref 136–145)
WBC # BLD AUTO: 11.7 K/UL (ref 4.6–13.2)

## 2018-09-22 PROCEDURE — 97161 PT EVAL LOW COMPLEX 20 MIN: CPT

## 2018-09-22 PROCEDURE — 74011636637 HC RX REV CODE- 636/637: Performed by: HOSPITALIST

## 2018-09-22 PROCEDURE — 97116 GAIT TRAINING THERAPY: CPT

## 2018-09-22 PROCEDURE — 74011250637 HC RX REV CODE- 250/637: Performed by: INTERNAL MEDICINE

## 2018-09-22 PROCEDURE — 65270000029 HC RM PRIVATE

## 2018-09-22 PROCEDURE — 80048 BASIC METABOLIC PNL TOTAL CA: CPT | Performed by: INTERNAL MEDICINE

## 2018-09-22 PROCEDURE — 74011636637 HC RX REV CODE- 636/637: Performed by: INTERNAL MEDICINE

## 2018-09-22 PROCEDURE — 74011000250 HC RX REV CODE- 250: Performed by: INTERNAL MEDICINE

## 2018-09-22 PROCEDURE — 92610 EVALUATE SWALLOWING FUNCTION: CPT

## 2018-09-22 PROCEDURE — 85025 COMPLETE CBC W/AUTO DIFF WBC: CPT | Performed by: INTERNAL MEDICINE

## 2018-09-22 PROCEDURE — 82962 GLUCOSE BLOOD TEST: CPT

## 2018-09-22 PROCEDURE — 36415 COLL VENOUS BLD VENIPUNCTURE: CPT | Performed by: INTERNAL MEDICINE

## 2018-09-22 PROCEDURE — 77030011256 HC DRSG MEPILEX <16IN NO BORD MOLN -A

## 2018-09-22 PROCEDURE — 82550 ASSAY OF CK (CPK): CPT | Performed by: INTERNAL MEDICINE

## 2018-09-22 PROCEDURE — 74011250636 HC RX REV CODE- 250/636: Performed by: INTERNAL MEDICINE

## 2018-09-22 RX ORDER — INSULIN LISPRO 100 [IU]/ML
10 INJECTION, SOLUTION INTRAVENOUS; SUBCUTANEOUS ONCE
Status: COMPLETED | OUTPATIENT
Start: 2018-09-22 | End: 2018-09-22

## 2018-09-22 RX ORDER — HYDRALAZINE HYDROCHLORIDE 25 MG/1
25 TABLET, FILM COATED ORAL 3 TIMES DAILY
Status: DISCONTINUED | OUTPATIENT
Start: 2018-09-22 | End: 2018-09-26 | Stop reason: HOSPADM

## 2018-09-22 RX ADMIN — INSULIN LISPRO 9 UNITS: 100 INJECTION, SOLUTION INTRAVENOUS; SUBCUTANEOUS at 12:21

## 2018-09-22 RX ADMIN — HYDRALAZINE HYDROCHLORIDE 10 MG: 10 TABLET, FILM COATED ORAL at 08:46

## 2018-09-22 RX ADMIN — INSULIN LISPRO 2 UNITS: 100 INJECTION, SOLUTION INTRAVENOUS; SUBCUTANEOUS at 08:46

## 2018-09-22 RX ADMIN — HEPARIN SODIUM 5000 UNITS: 5000 INJECTION INTRAVENOUS; SUBCUTANEOUS at 00:25

## 2018-09-22 RX ADMIN — HYDRALAZINE HYDROCHLORIDE 25 MG: 25 TABLET, FILM COATED ORAL at 17:04

## 2018-09-22 RX ADMIN — CEFTRIAXONE 1 G: 1 INJECTION, POWDER, FOR SOLUTION INTRAMUSCULAR; INTRAVENOUS at 17:04

## 2018-09-22 RX ADMIN — SITAGLIPTIN 50 MG: 25 TABLET, FILM COATED ORAL at 08:46

## 2018-09-22 RX ADMIN — ACETAMINOPHEN 650 MG: 325 TABLET ORAL at 21:29

## 2018-09-22 RX ADMIN — ACETAMINOPHEN 650 MG: 325 TABLET ORAL at 12:21

## 2018-09-22 RX ADMIN — INSULIN LISPRO 10 UNITS: 100 INJECTION, SOLUTION INTRAVENOUS; SUBCUTANEOUS at 00:25

## 2018-09-22 RX ADMIN — HYDRALAZINE HYDROCHLORIDE 25 MG: 25 TABLET, FILM COATED ORAL at 21:29

## 2018-09-22 RX ADMIN — ACETAMINOPHEN 650 MG: 325 TABLET ORAL at 17:04

## 2018-09-22 RX ADMIN — SODIUM CHLORIDE 100 ML/HR: 900 INJECTION, SOLUTION INTRAVENOUS at 14:56

## 2018-09-22 RX ADMIN — HEPARIN SODIUM 5000 UNITS: 5000 INJECTION INTRAVENOUS; SUBCUTANEOUS at 08:46

## 2018-09-22 RX ADMIN — HEPARIN SODIUM 5000 UNITS: 5000 INJECTION INTRAVENOUS; SUBCUTANEOUS at 17:04

## 2018-09-22 RX ADMIN — ACETAMINOPHEN 650 MG: 325 TABLET ORAL at 08:46

## 2018-09-22 RX ADMIN — INSULIN LISPRO 9 UNITS: 100 INJECTION, SOLUTION INTRAVENOUS; SUBCUTANEOUS at 17:02

## 2018-09-22 RX ADMIN — METOPROLOL SUCCINATE 50 MG: 50 TABLET, EXTENDED RELEASE ORAL at 08:46

## 2018-09-22 RX ADMIN — SODIUM CHLORIDE 100 ML/HR: 900 INJECTION, SOLUTION INTRAVENOUS at 03:40

## 2018-09-22 RX ADMIN — TRAMADOL HYDROCHLORIDE 50 MG: 50 TABLET, FILM COATED ORAL at 14:56

## 2018-09-22 RX ADMIN — INSULIN LISPRO 9 UNITS: 100 INJECTION, SOLUTION INTRAVENOUS; SUBCUTANEOUS at 21:29

## 2018-09-22 NOTE — PROGRESS NOTES
Problem: Mobility Impaired (Adult and Pediatric) Goal: *Acute Goals and Plan of Care (Insert Text) Physical Therapy Goals Initiated 9/22/2018 and to be accomplished within 3-5 day(s) 1. Patient will move from supine <> sit with S in prep for out of bed activity and change of position. 2.  Patient will perform sit<> stand with S with LRAD in prep for transfers/ambulation. 3.  Patient will transfer from bed <> chair with S with LRAD for time up in chair for completion of ADL activity. 4.  Patient will ambulate 150 feet with LRAD/S for improved functional mobility/safe discharge. Outcome: Progressing Towards Goal 
physical Therapy EVALUATION Patient: Aaron Winn (12 y.o. female) Date: 9/22/2018 Primary Diagnosis: Sepsis ([facility] [address] Post op infection UTI (urinary tract infection) UTI (urinary tract infection) VINNY (acute kidney injury) ([facility] [address] Precautions: Fall ASSESSMENT : 
Based on the objective data described below, the patient presents with decrease independence w/ bed mobility, transfers, and gait. Pt seen in supine prior to session w/ IV connected and B/L SCDs donned. Pt reports PTA that she uses a rollator for mobility. However reported the fall was caused because the AD was across from her and she thought she could ambulate without it. Pt educated on the importance of AD. For bed mobiilty, pt educated on pillow brace technique as pt reported L sided pain. Pt reported increase dizziness upon sitting at the EOB. Once sxs decreased pt able to ambulate to the restroom to perform toileting task but required assistance for self cleaning. Pt able to ambulate 35 ft w/ RW/GB and demonstrates decrease donavon. Pt left sitting in upright chair after session, call bell and tray in reach, nurse notified after session. Patient will benefit from skilled intervention to address the above impairments. Patients rehabilitation potential is considered to be Good Factors which may influence rehabilitation potential include:  
[]         None noted 
[]         Mental ability/status []         Medical condition 
[x]         Home/family situation and support systems 
[x]         Safety awareness [x]         Pain tolerance/management 
[]         Other: PLAN : 
Recommendations and Planned Interventions: 
[x]           Bed Mobility Training             [x]    Neuromuscular Re-Education 
[x]           Transfer Training                   []    Orthotic/Prosthetic Training 
[x]           Gait Training                          []    Modalities [x]           Therapeutic Exercises          []    Edema Management/Control 
[x]           Therapeutic Activities            [x]    Patient and Family Training/Education 
[]           Other (comment): Frequency/Duration: Patient will be followed by physical therapy once/twice daily to address goals. Discharge Recommendations: Home Health Further Equipment Recommendations for Discharge: N/A  
 
SUBJECTIVE:  
Patient stated I don't want to get back in the bed anymore, it makes me weak.  OBJECTIVE DATA SUMMARY:  
 
Past Medical History:  
Diagnosis Date  Diabetes ([facility] [address]  Hypertension Past Surgical History:  
Procedure Laterality Date  HX KNEE REPLACEMENT    
 bilateral  
 
Barriers to Learning/Limitations: yes;  physical 
Compensate with: Verbal Cues and Tactile Cues Prior Level of Function/Home Situation:  
Home Situation Home Environment: Assisted living Care Facility Name:  ([facility] One/Two Story Residence: Two story Living Alone: Yes Support Systems: Child(kamari) Patient Expects to be Discharged to[de-identified] Assisted living Current DME Used/Available at Home: oscar Casillas Critical Behavior: 
Neurologic State: Alert Orientation Level: Oriented X4 Cognition: Appropriate for age attention/concentration; Follows commands Safety/Judgement: Awareness of environment;Good awareness of safety precautions Psychosocial 
Purposeful Interaction: Yes Pt Identified Daily Priority: Clinical issues (comment) [employer] Process: Create healing environment Caring Interventions: Reassure Reassure: Caring rounds; Informing Skin Condition/Temp: Dry;Warm 
Skin Integrity: Wound (add Wound LDA) Skin Integumentary Skin Color: Appropriate for ethnicity; Ecchymosis (comment) Skin Condition/Temp: Dry;Warm 
Skin Integrity: Wound (add Wound LDA) Turgor: Epidermis thin w/ loss of subcut tissue Hair Growth: Present Varicosities: Absent Strength:   
Strength: Generally decreased, functional 
Tone & Sensation:  
Tone: Normal 
Sensation: Intact Range Of Motion: 
AROM: Generally decreased, functional 
Functional Mobility: 
Bed Mobility: 
Supine to Sit: Contact guard assistance;Minimum assistance Scooting: Contact guard assistance;Minimum assistance (VCs) Transfers: 
Sit to Stand: Contact guard assistance;Minimum assistance Stand to Sit: Contact guard assistance Balance:  
Sitting: Intact Standing: Intact; With support Ambulation/Gait Training: 
Distance (ft): 35 Feet (ft) Assistive Device: Gait belt;Walker, rolling Ambulation - Level of Assistance: Contact guard assistance Gait Description (WDL): Exceptions to [facility] Gait Abnormalities: Decreased step clearance Base of Support: Narrowed Speed/Toya: Slow Step Length: Left shortened;Right shortened Swing Pattern: Left asymmetrical;Right asymmetrical 
Pain: 
Pain Scale 1: Numeric (0 - 10) Pain Intensity 1: 3 Activity Tolerance:  
Fair Please refer to the flowsheet for vital signs taken during this treatment. After treatment:  
[x]         Patient left in no apparent distress sitting up in chair 
[]         Patient left in no apparent distress in bed 
[x]         Call bell left within reach [x]         Nursing notified 
[]         Caregiver present 
[]         Bed alarm activated COMMUNICATION/EDUCATION:  
[x]         Fall prevention education was provided and the patient/caregiver indicated understanding. [x]         Patient/family have participated as able in goal setting and plan of care. [x]         Patient/family agree to work toward stated goals and plan of care. []         Patient understands intent and goals of therapy, but is neutral about his/her participation. []         Patient is unable to participate in goal setting and plan of care. Thank you for this referral. 
Paola Fernando, PT Time Calculation: 23 mins Mobility: [id number] Current  CJ= 20-39%   Goal  CI= 1-19%. The severity rating is based on the Other Based on functional assessment

## 2018-09-22 NOTE — PROGRESS NOTES
Chart reviewed as CM on call. Pt admitted for VINNY. PT/OT/Speech eval orders noted. CM will follow for discharge planning needs. Claudio Parks Met with pt at bedside. Pt lives at [facility]. Pt has a RW. Pt states she has been to rehab @ [facility] in past.  Awaiting therapy recommendations and weight bearing status to assist in discharge planning. Pt uncertain of her PCP. Discharge dispo: TBD. Reason for Admission:   Per H&P, pt is a 80 y.o. female with past medical history significant for Dm2, HTN , OA& hyperlipidemia presents to the ER after sustaining a fall and being unable to get up from the floor and was on the ground x 12 hours. She denies dizziness, light headedness, LOC and her family reports she is instructed to use RW but has not been doing so. She fell on her R side and complains of pain in her chest wall, shoulder and hip. She denies nausea, vomiting or headache. She reports her pain is 10/10, worse w movement, better w analgesia, assocaited w weakness. RRAT Score:  5 low Plan for utilizing home health:      TBD, awaiting therapy recommendations Likelihood of Readmission:  Mod, considering PMHx DM, HTN Transition of Care Plan:       TBD Care Management Interventions Transition of Care Consult (CM Consult): Discharge Planning Physical Therapy Consult: Yes Occupational Therapy Consult: Yes Speech Therapy Consult:  Yes

## 2018-09-22 NOTE — PROGRESS NOTES
Elevated blood sugar of 422 and 504. Dr. Marisa Macedo notified. Continue with ordered slliding scale and recheck sugar in 2 hours. 0008  Blood sugar 360. 10 units given as ordered. 0200  Sleeping at intervals without problems. 0350  Awake and says she is lonely and wants someone to talk to. Feels like a panic attack coming on. Talked with patient for a while. Does not want tv. 
 
0440   Appears to be dozing.

## 2018-09-22 NOTE — PROGRESS NOTES
Hospitalist Progress Note Patient: Karen Garcia MRN: [id number]  CSN: [id number] YOB: 1925  Age: 80 y.o. Sex: female DOA: 9/21/2018 LOS:  LOS: 1 day Assessment and Plan: 
 
Principal Problem: 
  VINNY (acute kidney injury) ([facility] [address] (9/21/2018) Active Problems: 
  UTI (urinary tract infection) (9/21/2018) Sepsis ([facility] [address] (9/21/2018) Rib fracture (9/21/2018) Fracture of acromial process (9/21/2018) 1. VINNY 2. UTI 3. HTN  urgency 4. Nondisplaced rib fracture 5. Left acromial process fracture 6. DM2 
7. Venous stasis ulceration 8.stage 2 pressure ulceration of buttocks 
  
  
PLAN: 
1. Continue fluids and recheck in am . Will hold Lasix. hctz 2. Follow up  culture urine, blood and coninue ceftriaxone IV daily 3. DM: hold glipizide,sliding scale and continue [place]avi and [place] 4. HTN:  cont catapress, metoprolol, hydralazine but will increase the hydralazine - IV labetalol prn SBP> 180 
 
- start scheduled tylenol, tramadol & morhpine prn pain 
- mobilize w PT/OT 
- lenghty discussion surrounding code status, elects DNR 
- dvt ppx heparin Chief complaint:  Fall Subjective: She still feels nauseated at times Legs feel better and overall she feels better Review of systems: 
 
General: No fevers or chills. Cardiovascular: No chest pain or pressure. No palpitations. Pulmonary: No shortness of breath. Gastrointestinal: No nausea, vomiting. Objective: 
 
Vital signs/Intake and Output: 
Visit Vitals  /52 (BP 1 Location: Right arm, BP Patient Position: At rest)  Pulse 76  Temp 97.9 °F (36.6 °C)  Resp 18  Ht 5' 1\" (1.549 m)  Wt 78 kg (172 lb)  SpO2 94%  Breastfeeding No  
 BMI 32.5 kg/m2 Current Shift:    
Last three shifts:  09/20 1901 - 09/22 0700 In: 1273.3 [I.V.:1273.3] Out: - Physical Exam: 
General: NAD, AAOx3. Non-toxic. HEENT: NC/AT. SARI THOMASON.  ASHLEE. Lungs: Nml inspection. CTA B/L. No wheezing, rales or rhonchi. Heart:  S1S2 RRR,  PMI mid 5th IC space. No M/RG. Abdomen: Soft, NT/ND.  BS+. No peritoneal signs. Extremities: No C/C/E. Psych:   Nml affect. Neurologic:  2-12 intact. Strength 5/5 throughout. Sensation symmetrical. 
 
 
 
 
Labs: Results:  
   
Chemistry Recent Labs  
   09/22/18 
 0320  09/21/18 
 1144 GLU  142*  339* NA  137  134* K  3.4*  4.2 CL  98*  95* CO2  31  34* BUN  77*  81* CREA  2.40*  2.45* CA  8.3*  8.9 AGAP  8  5 BUCR  32*  33* AP   --   95  
TP   --   6.8 ALB   --   2.7*  
GLOB   --   4.1* AGRAT   --   0.7* CBC w/Diff Recent Labs  
   09/22/18 
 0320  09/21/18 
 1144 WBC  11.7  10.5 RBC  4.16*  4.56  
HGB  12.1  13.3 HCT  37.7  41.3 PLT  201  186 GRANS  68  72 LYMPH  19*  12* EOS  2  2 Cardiac Enzymes Recent Labs  
   09/22/18 
 0320  09/21/18 
 1144 CPK  152  309* CKND1   --   1.0 Coagulation Recent Labs  
   09/21/18 
 1144 PTP  13.4 INR  1.1 APTT  24.8 Lipid Panel No results found for: CHOL, CHOLPOCT, CHOLX, CHLST, CHOLV, 304525, HDL, LDL, LDLC, DLDLP, 263707, VLDLC, VLDL, TGLX, TRIGL, TRIGP, TGLPOCT, CHHD, CHHDX  
BNP No results for input(s): BNPP in the last 72 hours. Liver Enzymes Recent Labs  
   09/21/18 
 1144 TP  6.8 ALB  2.7* AP  95 SGOT  40* Thyroid Studies No results found for: T4, T3U, TSH, TSHEXT Procedures/imaging: see electronic medical records for all procedures/Xrays and details which were not copied into this note but were reviewed prior to creation of Plan

## 2018-09-22 NOTE — PROGRESS NOTES
Orders received. Chart reviewed. Per chart, pt has a non-displaced acromial process of the L UE, will need a ortho consult for WBing status before PT assessment can be performed. Will f/u with pt once pt's schedule allows

## 2018-09-22 NOTE — PROGRESS NOTES
Problem: Falls - Risk of 
Goal: *Absence of Falls Document Corby Chamberlain Fall Risk and appropriate interventions in the flowsheet. Fall Risk Interventions: 
Mobility Interventions: Patient to call before getting OOB, PT Consult for mobility concerns, Assess mobility with egress test 
 
  
 
Medication Interventions: Patient to call before getting OOB, Teach patient to arise slowly Elimination Interventions: Call light in reach, Patient to call for help with toileting needs, Toileting schedule/hourly rounds History of Falls Interventions: Evaluate medications/consider consulting pharmacy

## 2018-09-22 NOTE — ROUTINE PROCESS
Bedside and Verbal shift change report given to RICHAR Townsend RN  (oncoming nurse) by  COLIN Lomax RN  (offgoing nurse). Report included the following information SBAR, Kardex, Recent Results and Med Rec Status.

## 2018-09-22 NOTE — PROGRESS NOTES
Problem: Dysphagia (Adult) Goal: *Acute Goals and Plan of Care (Insert Text) Dysphagia Present:   No 
 
Recommendations: 
Diet: Regular/thin Meds: Per patient preference Patient will: 1. Participate in training and education related to continued aspiration risk, diet recs and compensatory strategies (goal met). Outcome: Resolved/Met Date Met: 09/22/18 Speech LAnguage Pathology bedside swallow evaluation AND DISCHARGE Patient: Maritza Miranda (86 y.o. female) Date: 9/22/2018 Primary Diagnosis: Sepsis ([facility] [address] Post op infection UTI (urinary tract infection) UTI (urinary tract infection) VINNY (acute kidney injury) ([facility] [address] Precautions: Fall PLOF: Independent ASSESSMENT : 
Clinical beside swallow eval completed per MD orders. Pt A&Ox3. Functional communication. Intelligibility >90%. Cognitive-linguistic function appears intact. OM examination revealed oral motor structures functional for mastication and deglutition. Presented with thin liquid, puree, and solid trials. Exhibited + bolus cohesion, manipulation and A-P transit. Further exhibited + swallow timing/reflex and hyolaryngeal excursion. Pt able to manipulate and clear with 0 clinical s/s aspiration and/or oropharyngeal dysphagia. Pt safe for regular solid, thin liquid diet. 0 formal ST needs for dysphagia indicated at this time. SLP educated pt on role of speech therapist in current setting with re: speech/swallow; verbalized comprehension. SLP available for re-evaluation if indicated by MD.  
Thank you for this referral.  
JOSEPHINE Jorgensen  
 
PLAN : 
Recommendations and Planned Interventions: 
No formal ST needs ID'd for dysphagia. Eval only. Discharge Recommendations: None SUBJECTIVE:  
Patient stated I have no problem swallowing. OBJECTIVE:  
 
Past Medical History:  
Diagnosis Date  Diabetes ([facility] [address]  Hypertension Past Surgical History:  
Procedure Laterality Date  HX KNEE REPLACEMENT    
 bilateral  
 Prior Level of Function/Home Situation: Independent Home Situation Home Environment: Assisted living One/Two Story Residence: Two story Living Alone: Yes Support Systems: Child(kamari) Patient Expects to be Discharged to[de-identified] Assisted living Current DME Used/Available at Home: oscar Wilson Diet prior to admission: Regular/thin Current Diet:  Regular/thin  
Cognitive and Communication Status: 
Neurologic State: Alert Orientation Level: Oriented to person, Oriented to place, Oriented to situation Cognition: Appropriate for age attention/concentration, Follows commands Perception: Appears intact Perseveration: No perseveration noted Safety/Judgement: Awareness of environment, Good awareness of safety precautions Oral Assessment: 
Oral Assessment Labial: No impairment Dentition: Natural 
Oral Hygiene: Good Lingual: No impairment Velum: No impairment Mandible: No impairment P.O. Trials: 
Patient Position: WKM 08 Vocal quality prior to P.O.: No impairment Consistency Presented: Thin liquid; Solid;Puree How Presented: Self-fed/presented;Straw;Successive swallows Bolus Acceptance: No impairment Bolus Formation/Control: No impairment Propulsion: No impairment Oral Residue: None Initiation of Swallow: No impairment Laryngeal Elevation: Functional 
Aspiration Signs/Symptoms: None Pharyngeal Phase Characteristics: No impairment, issues, or problems Effective Modifications: Small sips and bites Cues for Modifications: None Oral Phase Severity: No impairment Pharyngeal Phase Severity : No impairment GCODESwallowing:  Swallow Current Status CH= 0% 
 Swallow Goal Status CH= 0% 
 Swallow D/C Status CH= 0% The severity rating is based on the following outcomes: LUISITO Noms Swallow Level 7 Clinical Judgement PAIN: 
Start of Eval: 0 End of Eval: 0 After evaluation:  
[]            Patient left in no apparent distress sitting up in chair [x]            Patient left in no apparent distress in bed 
[x]            Call bell left within reach [x]            Nursing notified []            Family present 
[]            Caregiver present 
[]            Bed alarm activated COMMUNICATION/EDUCATION:  
[x]            Swallow safety; compensatory techniques. [x]            Patient/family have participated as able in goal setting and plan of care. [x]            Patient/family agree to work toward stated goals and plan of care. []            Patient understands intent and goals of therapy; neutral about participation. []            Patient unable to participate in goal setting/plan of care; educ ongoing with interdisciplinary staff 
[]         Posted safety precautions in patient's room. Thank you for this referral. 
Edwin Gibson, SLP Time Calculation: 14 mins

## 2018-09-22 NOTE — PROGRESS NOTES
Received bedside report from Travis Leal, [facility]. Patient received in bed awake. Assessment completed with no new findings at this time. Patient joan pain or discomfort at this time. Patient was able to take all po medication with no issues. Call bell and side table within reach. Will continue to monitor.

## 2018-09-22 NOTE — WOUND CARE
Wound Care Progress Note New consult placed by Dr. Kapil Olsen for decub Assessment:  
Communication: A&O x 4 communicative Wearing briefs for incontinence Requires partial assists in repositioning. Diet: cardiac Patient reports no pain. 1. POA incontinence dermatitis with resulting open area to gluteal fold/right buttock (wound location & etiology) = 6 x 1 x 0.1 cm Base is 75% of granulation tissue, 25% fibrin. Scant serous exudate. Periwound with erythema. Treatment: Proshield 2. POA LLE, venous ulcer (wound location & etiology) = 1 x 1 x 0.1 cm Base is 100% of granulation tissue. Small serous exudate. Periwound with erythema. Treatment: Mepilex border and delfina 3. POA RLE, venous ulcer (wound location & etiology) = 0.5 x 0.5 x 0.1 cm Base is 100% of granulation tissue. Scant serous exudate. Periwound  with erythema. Treatment: Delfina and mepilex border Pt position with legs elevated on pillow to reduce LE swelling Wound Recommendations:   
Refrain from use of foam dressings or briefs to reduce microclimate and allow inflammation on buttocks to be relieved, keep pt dry, apply proshield after each incontinent episode. Leave dressings to skins in place, wound care will return in 3-4 days to change. Skin Care & Pressure Relief Recommendations: 
Minimize layers of linen/pads under patient to optimize support surface. Turn/reposition approximately every 2 hours and offload heels pillows or Prevalon boots. Manage incontinence / promote continence; Proshield to buttocks and sacrum daily and as needed with incontinence care Discussed above plan with patient & Chencho Price nurse,  Kapil Olsen MD via tiger text Transition of Care: Plan to follow weekly and per product recommendations while admitted to hospital. 
 
 
Right buttock/gluteal fold RLE 
 
LLE Blanchable redness to buttocks

## 2018-09-22 NOTE — PROGRESS NOTES
Shift Summary :  Sleeping at intervals. Incontinent of urine. Repositioned in bed. Telemetry monitoring continued. Blood pressure within parameters and no distress noted.

## 2018-09-23 LAB
ANION GAP SERPL CALC-SCNC: 7 MMOL/L (ref 3–18)
BACTERIA SPEC CULT: ABNORMAL
BASOPHILS # BLD: 0 K/UL (ref 0–0.1)
BASOPHILS NFR BLD: 0 % (ref 0–2)
BUN SERPL-MCNC: 72 MG/DL (ref 7–18)
BUN/CREAT SERPL: 34 (ref 12–20)
CALCIUM SERPL-MCNC: 8.3 MG/DL (ref 8.5–10.1)
CHLORIDE SERPL-SCNC: 102 MMOL/L (ref 100–108)
CO2 SERPL-SCNC: 29 MMOL/L (ref 21–32)
CREAT SERPL-MCNC: 2.12 MG/DL (ref 0.6–1.3)
DIFFERENTIAL METHOD BLD: ABNORMAL
EOSINOPHIL # BLD: 0.4 K/UL (ref 0–0.4)
EOSINOPHIL NFR BLD: 4 % (ref 0–5)
ERYTHROCYTE [DISTWIDTH] IN BLOOD BY AUTOMATED COUNT: 13.5 % (ref 11.6–14.5)
GLUCOSE BLD STRIP.AUTO-MCNC: 174 MG/DL (ref 70–110)
GLUCOSE BLD STRIP.AUTO-MCNC: 190 MG/DL (ref 70–110)
GLUCOSE BLD STRIP.AUTO-MCNC: 231 MG/DL (ref 70–110)
GLUCOSE BLD STRIP.AUTO-MCNC: 256 MG/DL (ref 70–110)
GLUCOSE SERPL-MCNC: 105 MG/DL (ref 74–99)
HCT VFR BLD AUTO: 39.4 % (ref 35–45)
HGB BLD-MCNC: 12.5 G/DL (ref 12–16)
LYMPHOCYTES # BLD: 2.5 K/UL (ref 0.9–3.6)
LYMPHOCYTES NFR BLD: 20 % (ref 21–52)
MCH RBC QN AUTO: 29 PG (ref 24–34)
MCHC RBC AUTO-ENTMCNC: 31.7 G/DL (ref 31–37)
MCV RBC AUTO: 91.4 FL (ref 74–97)
MONOCYTES # BLD: 1.5 K/UL (ref 0.05–1.2)
MONOCYTES NFR BLD: 12 % (ref 3–10)
NEUTS SEG # BLD: 8 K/UL (ref 1.8–8)
NEUTS SEG NFR BLD: 64 % (ref 40–73)
PLATELET # BLD AUTO: 244 K/UL (ref 135–420)
PMV BLD AUTO: 10.3 FL (ref 9.2–11.8)
POTASSIUM SERPL-SCNC: 4 MMOL/L (ref 3.5–5.5)
RBC # BLD AUTO: 4.31 M/UL (ref 4.2–5.3)
SERVICE CMNT-IMP: ABNORMAL
SODIUM SERPL-SCNC: 138 MMOL/L (ref 136–145)
WBC # BLD AUTO: 12.4 K/UL (ref 4.6–13.2)

## 2018-09-23 PROCEDURE — 74011636637 HC RX REV CODE- 636/637: Performed by: INTERNAL MEDICINE

## 2018-09-23 PROCEDURE — 85025 COMPLETE CBC W/AUTO DIFF WBC: CPT | Performed by: INTERNAL MEDICINE

## 2018-09-23 PROCEDURE — 36415 COLL VENOUS BLD VENIPUNCTURE: CPT | Performed by: INTERNAL MEDICINE

## 2018-09-23 PROCEDURE — 97116 GAIT TRAINING THERAPY: CPT

## 2018-09-23 PROCEDURE — 65270000029 HC RM PRIVATE

## 2018-09-23 PROCEDURE — 74011250637 HC RX REV CODE- 250/637: Performed by: INTERNAL MEDICINE

## 2018-09-23 PROCEDURE — 82962 GLUCOSE BLOOD TEST: CPT

## 2018-09-23 PROCEDURE — 74011250636 HC RX REV CODE- 250/636: Performed by: INTERNAL MEDICINE

## 2018-09-23 PROCEDURE — 80048 BASIC METABOLIC PNL TOTAL CA: CPT | Performed by: INTERNAL MEDICINE

## 2018-09-23 PROCEDURE — 74011000250 HC RX REV CODE- 250: Performed by: INTERNAL MEDICINE

## 2018-09-23 RX ADMIN — INSULIN LISPRO 3 UNITS: 100 INJECTION, SOLUTION INTRAVENOUS; SUBCUTANEOUS at 17:16

## 2018-09-23 RX ADMIN — CEFTRIAXONE 1 G: 1 INJECTION, POWDER, FOR SOLUTION INTRAMUSCULAR; INTRAVENOUS at 17:16

## 2018-09-23 RX ADMIN — METOPROLOL SUCCINATE 50 MG: 50 TABLET, EXTENDED RELEASE ORAL at 09:03

## 2018-09-23 RX ADMIN — ACETAMINOPHEN 650 MG: 325 TABLET ORAL at 22:15

## 2018-09-23 RX ADMIN — INSULIN LISPRO 9 UNITS: 100 INJECTION, SOLUTION INTRAVENOUS; SUBCUTANEOUS at 22:15

## 2018-09-23 RX ADMIN — HEPARIN SODIUM 5000 UNITS: 5000 INJECTION INTRAVENOUS; SUBCUTANEOUS at 22:17

## 2018-09-23 RX ADMIN — HEPARIN SODIUM 5000 UNITS: 5000 INJECTION INTRAVENOUS; SUBCUTANEOUS at 09:03

## 2018-09-23 RX ADMIN — HEPARIN SODIUM 5000 UNITS: 5000 INJECTION INTRAVENOUS; SUBCUTANEOUS at 00:57

## 2018-09-23 RX ADMIN — ACETAMINOPHEN 650 MG: 325 TABLET ORAL at 09:03

## 2018-09-23 RX ADMIN — HYDRALAZINE HYDROCHLORIDE 25 MG: 25 TABLET, FILM COATED ORAL at 17:16

## 2018-09-23 RX ADMIN — ACETAMINOPHEN 650 MG: 325 TABLET ORAL at 14:17

## 2018-09-23 RX ADMIN — HYDRALAZINE HYDROCHLORIDE 25 MG: 25 TABLET, FILM COATED ORAL at 09:03

## 2018-09-23 RX ADMIN — HYDRALAZINE HYDROCHLORIDE 25 MG: 25 TABLET, FILM COATED ORAL at 22:15

## 2018-09-23 RX ADMIN — SITAGLIPTIN 50 MG: 25 TABLET, FILM COATED ORAL at 09:03

## 2018-09-23 RX ADMIN — ACETAMINOPHEN 650 MG: 325 TABLET ORAL at 17:16

## 2018-09-23 RX ADMIN — INSULIN LISPRO 6 UNITS: 100 INJECTION, SOLUTION INTRAVENOUS; SUBCUTANEOUS at 09:04

## 2018-09-23 RX ADMIN — HEPARIN SODIUM 5000 UNITS: 5000 INJECTION INTRAVENOUS; SUBCUTANEOUS at 17:16

## 2018-09-23 RX ADMIN — INSULIN LISPRO 9 UNITS: 100 INJECTION, SOLUTION INTRAVENOUS; SUBCUTANEOUS at 14:17

## 2018-09-23 NOTE — ROUTINE PROCESS
Bedside shift change report given to Andre Berger RN (oncoming nurse) by Woody Hill RN (offgoing nurse). Report included the following information SBAR, Kardex, MAR, Recent Results, Cardiac Rhythm NSR and Alarm Parameters .

## 2018-09-23 NOTE — PROGRESS NOTES
Hospitalist Progress Note Patient: Maritza Miranda MRN: [id number]  CSN: [id number] YOB: 1925  Age: 80 y.o. Sex: female DOA: 9/21/2018 LOS:  LOS: 2 days Assessment and Plan: 
 
Principal Problem: 
  VINNY (acute kidney injury) ([facility] [address] (9/21/2018) Active Problems: 
  UTI (urinary tract infection) (9/21/2018) Sepsis ([facility] [address] (9/21/2018) Rib fracture (9/21/2018) Fracture of acromial process (9/21/2018) 1. VINNY: better today  Will Continue fluids and recheck in am and continue to hold Lasix. hctz 2. E coli UTI:  coninue ceftriaxone IV daily 3. DM: hold glipizide,sliding scale and continue [place]avi and [place] 4. HTN:  better today   cont catapress, metoprolol, and incresed  hydralazine - IV labetalol prn SBP> 180 
  
scheduled tylenol, tramadol & morhpine prn pain 
 
- mobilize w PT/OT she is resigned to go to rehab . Daughter has on epicked out DNR 
- dvt ppx heparin Chief complaint:  Fall Subjective: She is up in chair and feeling a little better. Still uupset with being in the hospital  
 
 
Review of systems: 
 
General: No fevers or chills. Cardiovascular: No chest pain or pressure. No palpitations. Pulmonary: No shortness of breath. Gastrointestinal: No nausea, vomiting. Objective: 
 
Vital signs/Intake and Output: 
Visit Vitals  /57 (BP 1 Location: Right arm, BP Patient Position: Sitting)  Pulse 71  Temp 97.6 °F (36.4 °C)  Resp 18  Ht 5' 1\" (1.549 m)  Wt 79.7 kg (175 lb 9.6 oz)  SpO2 97%  Breastfeeding No  
 BMI 33.18 kg/m2 Current Shift:    
Last three shifts:  09/21 1901 - 09/23 0700 In: 1993.3 [P.O.:720; I.V.:1273.3] Out: - Physical Exam: 
General: NAD, AAOx3. Non-toxic. HEENT: NC/AT. PERRLA, EOMI.  MMM. Lungs: Nml inspection. CTA B/L. No wheezing, rales or rhonchi. Heart:  S1S2 RRR,  PMI mid 5th IC space. No M/RG. Abdomen: Soft, NT/ND.  BS+. No peritoneal signs. Extremities: No C/C/E. Psych:   Nml affect. Neurologic:  2-12 intact. Strength 5/5 throughout. Sensation symmetrical. 
 
 
 
 
Labs: Results:  
   
Chemistry Recent Labs  
   09/23/18 0235 09/22/18 0320 09/21/18 
 1144 GLU  105*  142*  339* NA  138  137  134* K  4.0  3.4*  4.2 CL  102  98*  95* CO2  29  31  34* BUN  72*  77*  81* CREA  2.12*  2.40*  2.45* CA  8.3*  8.3*  8.9 AGAP  7  8  5 BUCR  34*  32*  33* AP   --    --   95  
TP   --    --   6.8 ALB   --    --   2.7*  
GLOB   --    --   4.1* AGRAT   --    --   0.7* CBC w/Diff Recent Labs  
   09/23/18 0235 09/22/18 0320 09/21/18 
 1144 WBC  12.4  11.7  10.5 RBC  4.31  4.16*  4.56  
HGB  12.5  12.1  13.3 HCT  39.4  37.7  41.3 PLT  244  201  186 GRANS  64  68  72 LYMPH  20*  19*  12* EOS  4  2  2 Cardiac Enzymes Recent Labs  
   09/22/18 
 0320 09/21/18 
 1144 CPK  152  309* CKND1   --   1.0 Coagulation Recent Labs  
   09/21/18 
 1144 PTP  13.4 INR  1.1 APTT  24.8 Lipid Panel No results found for: CHOL, CHOLPOCT, CHOLX, CHLST, CHOLV, 210127, HDL, LDL, LDLC, DLDLP, [id number], VLDLC, VLDL, TGLX, TRIGL, TRIGP, TGLPOCT, CHHD, CHHDX  
BNP No results for input(s): BNPP in the last 72 hours. Liver Enzymes Recent Labs  
   09/21/18 
 1144 TP  6.8 ALB  2.7* AP  95 SGOT  40* Thyroid Studies No results found for: T4, T3U, TSH, TSHEXT Procedures/imaging: see electronic medical records for all procedures/Xrays and details which were not copied into this note but were reviewed prior to creation of Plan Yes

## 2018-09-23 NOTE — PROGRESS NOTES
Problem: Mobility Impaired (Adult and Pediatric) Goal: *Acute Goals and Plan of Care (Insert Text) Physical Therapy Goals Initiated 9/22/2018 and to be accomplished within 3-5 day(s) 1. Patient will move from supine <> sit with S in prep for out of bed activity and change of position. 2.  Patient will perform sit<> stand with S with LRAD in prep for transfers/ambulation. 3.  Patient will transfer from bed <> chair with S with LRAD for time up in chair for completion of ADL activity. 4.  Patient will ambulate 150 feet with LRAD/S for improved functional mobility/safe discharge. Outcome: Progressing Towards Goal 
physical Therapy TREATMENT Patient: Pamela Gautam (88 y.o. female) Date: 9/23/2018 Diagnosis: Sepsis ([address] Post op infection UTI (urinary tract infection) UTI (urinary tract infection) VINNY (acute kidney injury) ([address] VINNY (acute kidney injury) ([facility] [address] Precautions: Fall Chart, physical therapy assessment, plan of care and goals were reviewed. ASSESSMENT: 
Pt agreeable to participate and requesting to use restroom 1st. CGA needed as standing balance is unsteady with RW. Pt reports increase B LE weakness. Pt also presents with decrease tolerance to activity and fatigues quickly. VCs for correct RW management and safety. Pt would benefit from rehab placement to maximize functional independence and reduce fall risk. Cont POC. Progression toward goals: 
[]      Improving appropriately and progressing toward goals [x]      Improving slowly and progressing toward goals 
[]      Not making progress toward goals and plan of care will be adjusted PLAN: 
Patient continues to benefit from skilled intervention to address the above impairments. Continue treatment per established plan of care. Discharge Recommendations:  Rehab or Home Health pending progress Further Equipment Recommendations for Discharge:  rolling walker SUBJECTIVE:  
Patient stated  It's boring in here OBJECTIVE DATA SUMMARY:  
Critical Behavior: 
Neurologic State: Alert Orientation Level: Oriented X4 Cognition: Follows commands Safety/Judgement: Awareness of environment, Good awareness of safety precautions Functional Mobility Training: 
Transfers: 
Sit to Stand: Contact guard assistance Stand to Sit: Contact guard assistance Balance: 
Sitting: Intact Standing: Intact; With support Ambulation/Gait Training: 
Distance (ft): 40 Feet (ft) Assistive Device: Walker, rolling;Gait belt Ambulation - Level of Assistance: Contact guard assistance Gait Abnormalities: Decreased step clearance; Step to gait Base of Support: Narrowed Speed/Toya: Slow Step Length: Right shortened;Left shortened Swing Pattern: Left asymmetrical;Right asymmetrical 
 
Pain: 
Pain Scale 1: Numeric (0 - 10) Pain Intensity 1: 0 Activity Tolerance:  
Fair After treatment:  
[x] Patient left in no apparent distress sitting up in chair 
[] Patient left in no apparent distress in bed 
[x] Call bell left within reach 
[] Nursing notified 
[] Caregiver present 
[] Bed alarm activated Anjali Osorio PTA Time Calculation: 13 mins

## 2018-09-23 NOTE — PROGRESS NOTES
Problem: Falls - Risk of 
Goal: *Absence of Falls Document Fanny Shultz Fall Risk and appropriate interventions in the flowsheet. Outcome: Progressing Towards Goal 
Fall Risk Interventions: 
Mobility Interventions: Patient to call before getting OOB, PT Consult for mobility concerns, PT Consult for assist device competence Medication Interventions: Evaluate medications/consider consulting pharmacy, Patient to call before getting OOB, Teach patient to arise slowly Elimination Interventions: Call light in reach, Patient to call for help with toileting needs History of Falls Interventions: Door open when patient unattended, Evaluate medications/consider consulting pharmacy, Investigate reason for fall

## 2018-09-23 NOTE — PROGRESS NOTES
Problem: Pressure Injury - Risk of 
Goal: *Prevention of pressure injury Document Kaveh Scale and appropriate interventions in the flowsheet. Outcome: Not Progressing Towards Goal 
Pressure Injury Interventions: 
Sensory Interventions: Avoid rigorous massage over bony prominences, Check visual cues for pain, Minimize linen layers, Pressure redistribution bed/mattress (bed type) Moisture Interventions: Absorbent underpads, Maintain skin hydration (lotion/cream) Activity Interventions: Pressure redistribution bed/mattress(bed type) Mobility Interventions: Pressure redistribution bed/mattress (bed type) Nutrition Interventions: Document food/fluid/supplement intake, Discuss nutritional consult with provider Friction and Shear Interventions: Lift sheet

## 2018-09-23 NOTE — ROUTINE PROCESS
Bedside and Verbal shift change report given to Don Jones (oncoming nurse) by Radha Richard 
 (offgoing nurse). Report included the following information SBAR, Kardex, Intake/Output and MAR.

## 2018-09-24 ENCOUNTER — APPOINTMENT (OUTPATIENT)
Dept: ULTRASOUND IMAGING | Age: 83
DRG: 683 | End: 2018-09-24
Attending: HOSPITALIST
Payer: MEDICARE

## 2018-09-24 LAB
ANION GAP SERPL CALC-SCNC: 8 MMOL/L (ref 3–18)
BASOPHILS # BLD: 0 K/UL (ref 0–0.1)
BASOPHILS NFR BLD: 0 % (ref 0–2)
BUN SERPL-MCNC: 65 MG/DL (ref 7–18)
BUN/CREAT SERPL: 31 (ref 12–20)
CALCIUM SERPL-MCNC: 8.7 MG/DL (ref 8.5–10.1)
CHLORIDE SERPL-SCNC: 102 MMOL/L (ref 100–108)
CO2 SERPL-SCNC: 29 MMOL/L (ref 21–32)
CREAT SERPL-MCNC: 2.08 MG/DL (ref 0.6–1.3)
DIFFERENTIAL METHOD BLD: ABNORMAL
EOSINOPHIL # BLD: 0.4 K/UL (ref 0–0.4)
EOSINOPHIL NFR BLD: 4 % (ref 0–5)
ERYTHROCYTE [DISTWIDTH] IN BLOOD BY AUTOMATED COUNT: 13.4 % (ref 11.6–14.5)
GLUCOSE BLD STRIP.AUTO-MCNC: 138 MG/DL (ref 70–110)
GLUCOSE BLD STRIP.AUTO-MCNC: 177 MG/DL (ref 70–110)
GLUCOSE BLD STRIP.AUTO-MCNC: 194 MG/DL (ref 70–110)
GLUCOSE BLD STRIP.AUTO-MCNC: 303 MG/DL (ref 70–110)
GLUCOSE SERPL-MCNC: 149 MG/DL (ref 74–99)
HCT VFR BLD AUTO: 39.9 % (ref 35–45)
HGB BLD-MCNC: 12.8 G/DL (ref 12–16)
LYMPHOCYTES # BLD: 2.3 K/UL (ref 0.9–3.6)
LYMPHOCYTES NFR BLD: 21 % (ref 21–52)
MCH RBC QN AUTO: 29.2 PG (ref 24–34)
MCHC RBC AUTO-ENTMCNC: 32.1 G/DL (ref 31–37)
MCV RBC AUTO: 90.9 FL (ref 74–97)
MONOCYTES # BLD: 1.1 K/UL (ref 0.05–1.2)
MONOCYTES NFR BLD: 11 % (ref 3–10)
NEUTS SEG # BLD: 6.9 K/UL (ref 1.8–8)
NEUTS SEG NFR BLD: 64 % (ref 40–73)
PLATELET # BLD AUTO: 271 K/UL (ref 135–420)
PMV BLD AUTO: 10.1 FL (ref 9.2–11.8)
POTASSIUM SERPL-SCNC: 4.1 MMOL/L (ref 3.5–5.5)
RBC # BLD AUTO: 4.39 M/UL (ref 4.2–5.3)
SODIUM SERPL-SCNC: 139 MMOL/L (ref 136–145)
WBC # BLD AUTO: 10.9 K/UL (ref 4.6–13.2)

## 2018-09-24 PROCEDURE — 97116 GAIT TRAINING THERAPY: CPT

## 2018-09-24 PROCEDURE — 97530 THERAPEUTIC ACTIVITIES: CPT

## 2018-09-24 PROCEDURE — 74011250636 HC RX REV CODE- 250/636: Performed by: HOSPITALIST

## 2018-09-24 PROCEDURE — 36415 COLL VENOUS BLD VENIPUNCTURE: CPT | Performed by: INTERNAL MEDICINE

## 2018-09-24 PROCEDURE — 80048 BASIC METABOLIC PNL TOTAL CA: CPT | Performed by: INTERNAL MEDICINE

## 2018-09-24 PROCEDURE — 74011250637 HC RX REV CODE- 250/637: Performed by: PHYSICIAN ASSISTANT

## 2018-09-24 PROCEDURE — 74011000250 HC RX REV CODE- 250: Performed by: INTERNAL MEDICINE

## 2018-09-24 PROCEDURE — 97535 SELF CARE MNGMENT TRAINING: CPT

## 2018-09-24 PROCEDURE — 74011636637 HC RX REV CODE- 636/637: Performed by: PHYSICIAN ASSISTANT

## 2018-09-24 PROCEDURE — 74011250636 HC RX REV CODE- 250/636: Performed by: INTERNAL MEDICINE

## 2018-09-24 PROCEDURE — 85025 COMPLETE CBC W/AUTO DIFF WBC: CPT | Performed by: INTERNAL MEDICINE

## 2018-09-24 PROCEDURE — 74011250637 HC RX REV CODE- 250/637: Performed by: INTERNAL MEDICINE

## 2018-09-24 PROCEDURE — 65270000029 HC RM PRIVATE

## 2018-09-24 PROCEDURE — 82962 GLUCOSE BLOOD TEST: CPT

## 2018-09-24 PROCEDURE — 74011636637 HC RX REV CODE- 636/637: Performed by: INTERNAL MEDICINE

## 2018-09-24 PROCEDURE — 76770 US EXAM ABDO BACK WALL COMP: CPT

## 2018-09-24 PROCEDURE — 97167 OT EVAL HIGH COMPLEX 60 MIN: CPT

## 2018-09-24 RX ORDER — INSULIN GLARGINE 100 [IU]/ML
8 INJECTION, SOLUTION SUBCUTANEOUS
Status: DISCONTINUED | OUTPATIENT
Start: 2018-09-24 | End: 2018-09-26 | Stop reason: HOSPADM

## 2018-09-24 RX ORDER — SODIUM CHLORIDE 9 MG/ML
75 INJECTION, SOLUTION INTRAVENOUS CONTINUOUS
Status: DISCONTINUED | OUTPATIENT
Start: 2018-09-24 | End: 2018-09-26 | Stop reason: HOSPADM

## 2018-09-24 RX ORDER — INSULIN LISPRO 100 [IU]/ML
4 INJECTION, SOLUTION INTRAVENOUS; SUBCUTANEOUS
Status: DISCONTINUED | OUTPATIENT
Start: 2018-09-24 | End: 2018-09-26 | Stop reason: HOSPADM

## 2018-09-24 RX ORDER — ESCITALOPRAM OXALATE 10 MG/1
10 TABLET ORAL DAILY
Status: DISCONTINUED | OUTPATIENT
Start: 2018-09-24 | End: 2018-09-26 | Stop reason: HOSPADM

## 2018-09-24 RX ADMIN — HYDRALAZINE HYDROCHLORIDE 25 MG: 25 TABLET, FILM COATED ORAL at 22:01

## 2018-09-24 RX ADMIN — ACETAMINOPHEN 650 MG: 325 TABLET ORAL at 08:41

## 2018-09-24 RX ADMIN — ESCITALOPRAM OXALATE 10 MG: 10 TABLET ORAL at 12:49

## 2018-09-24 RX ADMIN — CEFTRIAXONE 1 G: 1 INJECTION, POWDER, FOR SOLUTION INTRAMUSCULAR; INTRAVENOUS at 17:28

## 2018-09-24 RX ADMIN — INSULIN LISPRO 12 UNITS: 100 INJECTION, SOLUTION INTRAVENOUS; SUBCUTANEOUS at 12:49

## 2018-09-24 RX ADMIN — HYDRALAZINE HYDROCHLORIDE 25 MG: 25 TABLET, FILM COATED ORAL at 17:28

## 2018-09-24 RX ADMIN — HYDRALAZINE HYDROCHLORIDE 25 MG: 25 TABLET, FILM COATED ORAL at 08:41

## 2018-09-24 RX ADMIN — METOPROLOL SUCCINATE 50 MG: 50 TABLET, EXTENDED RELEASE ORAL at 08:41

## 2018-09-24 RX ADMIN — INSULIN LISPRO 6 UNITS: 100 INJECTION, SOLUTION INTRAVENOUS; SUBCUTANEOUS at 22:02

## 2018-09-24 RX ADMIN — ACETAMINOPHEN 650 MG: 325 TABLET ORAL at 17:28

## 2018-09-24 RX ADMIN — ACETAMINOPHEN 650 MG: 325 TABLET ORAL at 12:49

## 2018-09-24 RX ADMIN — HEPARIN SODIUM 5000 UNITS: 5000 INJECTION INTRAVENOUS; SUBCUTANEOUS at 17:30

## 2018-09-24 RX ADMIN — ACETAMINOPHEN 650 MG: 325 TABLET ORAL at 22:01

## 2018-09-24 RX ADMIN — SITAGLIPTIN 50 MG: 25 TABLET, FILM COATED ORAL at 08:41

## 2018-09-24 RX ADMIN — INSULIN LISPRO 3 UNITS: 100 INJECTION, SOLUTION INTRAVENOUS; SUBCUTANEOUS at 08:41

## 2018-09-24 RX ADMIN — INSULIN LISPRO 4 UNITS: 100 INJECTION, SOLUTION INTRAVENOUS; SUBCUTANEOUS at 17:29

## 2018-09-24 RX ADMIN — SODIUM CHLORIDE 75 ML/HR: 900 INJECTION, SOLUTION INTRAVENOUS at 12:49

## 2018-09-24 RX ADMIN — INSULIN GLARGINE 8 UNITS: 100 INJECTION, SOLUTION SUBCUTANEOUS at 22:03

## 2018-09-24 RX ADMIN — HEPARIN SODIUM 5000 UNITS: 5000 INJECTION INTRAVENOUS; SUBCUTANEOUS at 08:41

## 2018-09-24 NOTE — ROUTINE PROCESS
Bedside shift change report given to Raj Arrington RN (oncoming nurse) by García Almazan RN (offgoing nurse). Report included the following information SBAR, Kardex, MAR, Recent Results, Med Rec Status and Cardiac Rhythm NSR.

## 2018-09-24 NOTE — PROGRESS NOTES
D/C Plan: SNF 9/25/18 CM met with pt's daughter, Santy Fry ([phone]), to discuss transition of care. Pt and family would like SNF placement. CM provided a list of area facilities to refer to and offered [facility]. They would like to have clinical information sent to [facility], Rolando vera and Gurdeep Burns  (in that order). CMS has been notified top assist.  CM continuing to follow. Anticipate pt will be stable for discharge within the next 24-48 hours. Care Management Interventions Transition of Care Consult (CM Consult): Discharge Planning, SNF Health Maintenance Reviewed: Yes Physical Therapy Consult: Yes Occupational Therapy Consult: Yes Speech Therapy Consult: Yes Current Support Network: [facility] [place], Assisted Living Confirm Follow Up Transport: Family Plan discussed with Pt/Family/Caregiver: Yes Freedom of Choice Offered: Yes Discharge Location Discharge Placement: Skilled nursing facility

## 2018-09-24 NOTE — DIABETES MGMT
Diabetes Patient/Family Education Record Factors That  May Influence Patients Ability  to Learn or  Comply with Recommendations 
 []   Language barrier    []   Cultural needs   []   Motivation  
 []   Cognitive limitation    []   Physical   []   Education  
 []   Physiological factors   []   Hearing/vision/speaking impairment   []   Yarsani beliefs []   Financial factors   []  Other:   [x]  No factors identified at this time. Person Instructed: 
 [x]   Patient   []   Family   []  Other Preference for Learning: 
 [x]   Verbal   []   Written   []  Demonstration Level of Comprehension & Competence:   
[]  Good                                      [x] Fair                                     []  Poor                             [x]  Needs Reinforcement  
[x]  Teachback completed Education Component:  
[x]  Medication management, including how to administer insulin (if appropriate) and potential medication interactions []  Nutritional management   
[]  Exercise  
[]  Signs, symptoms, and treatment of hyperglycemia and hypoglycemia  
[] Prevention, recognition and treatment of hyperglycemia and hypoglycemia [x]  Importance of blood glucose monitoring  
[]  Instruction on use of the blood glucose meter  
[]  Discuss the importance of HbA1C monitoring   
[]  Sick day guidelines  
[]  Proper use and disposal of lancets, needles, syringes or insulin pens (if appropriate) [x]  Potential long-term complications (retinopathy, kidney disease, neuropathy, foot care)  
[] Information about whom to contact in case of emergency or for more information   
[x]  Goal:  Patient/family will demonstrate understanding of Diabetes Self Management Skills by: 10/4/18 Plan for post-discharge education or self-management support: 
  [] Outpatient class schedule provided            [] Patient Declined 
  [] Scheduled for outpatient classes (date) _______ Tarah Hernandez RD 
 [facility] [phone]

## 2018-09-24 NOTE — PROGRESS NOTES
Shift Summary- Pt had a few loose stools during the night. No complaints of pain. Legs with 2+ pitting edema and weeping. Pt up with assistance with walker. Patient Vitals for the past 12 hrs: 
 Temp Pulse Resp BP SpO2  
09/24/18 0325 97.2 °F (36.2 °C) 67 18 169/42 96 %  
09/23/18 2242 98.2 °F (36.8 °C) 60 18 138/53 99 % 09/23/18 1922 97.6 °F (36.4 °C) 65 18 149/50 97 %

## 2018-09-24 NOTE — PROGRESS NOTES
Problem: Falls - Risk of 
Goal: *Absence of Falls Document Parminder Stone Fall Risk and appropriate interventions in the flowsheet. Outcome: Progressing Towards Goal 
Fall Risk Interventions: 
Mobility Interventions: Patient to call before getting OOB Mentation Interventions: Evaluate medications/consider consulting pharmacy, Increase mobility, More frequent rounding Medication Interventions: Evaluate medications/consider consulting pharmacy, Patient to call before getting OOB, Teach patient to arise slowly Elimination Interventions: Patient to call for help with toileting needs History of Falls Interventions: Door open when patient unattended, Evaluate medications/consider consulting pharmacy, Investigate reason for fall

## 2018-09-24 NOTE — PROGRESS NOTES
Problem: Self Care Deficits Care Plan (Adult) Goal: *Acute Goals and Plan of Care (Insert Text) Initial Occupational Therapy Goals (9/24/2018) Within 7 day(s): 1. Patient will perform grooming standing at sink with Supervision/mod I x 5 minutes for increased independence with ADLs. 2. Patient will perform UB dressing with setup for increased independence with ADLs. 3. Patient will perform LB dressing with setup & A/E PRN for increased independence with ADLs. 4. Patient will perform all aspects of toileting with Supervision/mod I for increased independence in ADLs 5. Patient will independently apply energy conservation techniques with 1 verbal cue(s) for increased independence with ADLs. 6. Patient will utilize good body mechanics during ADLs with 1 verbal cue(s). 7. Patient will independently manage O2 tubing to safely ambulate to/from bathroom. Outcome: Progressing Towards Goal 
Occupational Therapy EVALUATION Patient: Sidney Smith (53 y.o. female) Date: 9/24/2018 Primary Diagnosis: Sepsis ([facility] [address] Post op infection UTI (urinary tract infection) UTI (urinary tract infection) VINNY (acute kidney injury) ([facility] [address] Precautions:  Fall, Skin,DNR, L acromion process fx ASSESSMENT : 
Based on the objective data described below, the patient presents with decreased functional strength, decreased functional balance, decreased overall activity tolerance limiting independence with ADLs. Patient seated up in chair. Pt w/ LE edema with redness and mild weeping of fluid. Pt utilizing forward flexion in chair for sock donning w/ significant strain, but declined sitting EOB as pt reports the cause of her fall. (Pt reports she is getting a new mattress). Pt would benefit from continued OT services to maximize independence in ADLs.  
 
Education: Patient instructed on home safety, body mechanics for optimal respiratory effort, Energy Conservation/Work Simplification Techniques, adaptive strategies and adaptive dressing techniques including clothing modifications with patient  verbalizing understanding at this time. Patient will benefit from skilled intervention to address the above impairments. Patients rehabilitation potential is considered to be Good Factors which may influence rehabilitation potential include:  
[]             None noted [x]             Mental ability/status [x]             Medical condition []             Home/family situation and support systems [x]             Safety awareness []             Pain tolerance/management 
[]             Other: PLAN : 
Recommendations and Planned Interventions: 
[x]               Self Care Training                  [x]        Therapeutic Activities [x]               Functional Mobility Training    [x]        Cognitive Retraining 
[x]               Therapeutic Exercises           [x]        Endurance Activities [x]               Balance Training                   [x]        Neuromuscular Re-Education []               Visual/Perceptual Training     [x]   Home Safety Training 
[x]               Patient Education                 [x]        Family Training/Education []               Other (comment): Frequency/Duration: Patient will be followed by occupational therapy 1-2 times per day/2-4 days per week to address goals. Discharge Recommendations: Rehab and Home Health (depending upon level of ADL assist provided at [facility]) Further Equipment Recommendations for Discharge: To Be Determined (TBD) at next level of care SUBJECTIVE:  
Patient stated I won't sit on the edge of the bed. That's how I fell.  OBJECTIVE DATA SUMMARY:  
 
Past Medical History:  
Diagnosis Date  Diabetes ([facility] [address]  Hypertension Past Surgical History:  
Procedure Laterality Date  HX KNEE REPLACEMENT    
 bilateral  
 
Barriers to Learning/Limitations: yes;  sensory deficits-vision/hearing/speech and physical 
 Compensate with: visual, verbal, tactile, kinesthetic cues/model Prior Level of Function/Home Situation: Pt eager to be OOB and engaged in activity Home Situation Home Environment: Assisted living Care [facility]) One/Two Story Residence: Two story Living Alone: Yes Support Systems: Child(kamari) Patient Expects to be Discharged to[de-identified] Assisted living Current DME Used/Available at Home: Geronimo Maria Elena, rollator [x]  Right hand dominant   []  Left hand dominant Cognitive/Behavioral Status: 
Neurologic State: Alert Orientation Level: Oriented X4 Cognition: Decreased attention/concentration; Follows commands Safety/Judgement: Awareness of environment Skin: BLE feet to shins w/ Mepilex, red and RLE weeping serous fluid Edema: BLE moderate tight edema Vision/Perceptual:   
  appears [place] Coordination: 
Coordination: Within functional limits Fine Motor Skills-Upper: Left Intact; Right Intact Gross Motor Skills-Upper: Left Intact; Right Intact Balance: 
Sitting: Intact Standing: Intact; With support Strength: 
Strength: Generally decreased, functional 
Tone & Sensation: 
Tone: Normal 
Sensation: Intact Range of Motion: (LUE noted to be functional w/ pt able to move FROM w/o c/o pain) AROM: Generally decreased, functional 
PROM: Generally decreased, functional 
 
Functional Mobility and Transfers for ADLs: 
Transfers: 
Sit to Stand: Contact guard assistance Bed to Chair: Contact guard assistance Toilet Transfer : Contact guard assistance ADL Assessment:  
Feeding: Setup Oral Facial Hygiene/Grooming: Contact guard assistance Bathing: Minimum assistance; Additional time Upper Body Dressing: Contact guard assistance Lower Body Dressing: Minimum assistance; Additional time ADL Intervention: 
Feeding Feeding Assistance: Supervision/set-up Container Management: Contact guard assistance;Minimum assistance Cutting Food: Contact guard assistance;Minimum assistance Utensil Management: Supervision/set-up Food to Mouth: Supervision/set-up Drink to Mouth: Supervision/set-up Lower Body Dressing Assistance Underpants: Minimum assistance; Moderate assistance Socks: Moderate assistance;Minimum assistance; Compensatory technique training Position Performed: Bending forward method;Seated in chair Cognitive Retraining Problem Solving: Inductive reason; Identifying the task; Identifying the problem;General alternative solution;Deductive reason; Awareness of environment Executive Functions: Executing cognitive plans;Managing time Organizing/Sequencing: Breaking task down;Prioritizing Safety/Judgement: Awareness of environment Cues: Tactile cues provided;Verbal cues provided;Visual cues provided Pain: 
Pre-treatment: 0/10 Post-treatment: 0/10 Activity Tolerance:  
Patient able to stand 1-2 minute(s). Patient able to complete ADLs with intermittent rest breaks. Patient limited by strength/balance/functional endurance. Patient unsteady Please refer to the flowsheet for vital signs taken during this treatment. After treatment:  
[x] Patient left in no apparent distress sitting up in chair 
[] Patient left in no apparent distress in bed 
[x] Call bell left within reach [x] Nursing notified 
[x] Caregiver present/RN [] Bed alarm activated COMMUNICATION/EDUCATION:  
[x] Home safety education was provided and the patient/caregiver indicated understanding. [x] Patient/family have participated as able in goal setting and plan of care. [x] Patient/family agree to work toward stated goals and plan of care. [] Patient understands intent and goals of therapy, but is neutral about his/her participation. [] Patient is unable to participate in goal setting and plan of care. Thank you for this referral. 
Akanksha Rosas, OTR/L Time Calculation: 23 mins G-Codes (GP) Self Care  Current  CJ= 20-39%  Goal  CI= 1-19% The severity rating is based on the professional judgement & direct observation of Level of Assistance required for Functional Mobility and ADLs. Eval Complexity: History: HIGH Complexity : Extensive review of history including physical, cognitive and psychosocial history ; Examination: HIGH Complexity : 5 or more performance deficits relating to physical, cognitive , or psychosocial skils that result in activity limitations and / or participation restrictions; Decision Making:HIGH Complexity : Patient presents with comorbidities that affect occupational performance. Signifigant modification of tasks or assistance (eg, physical or verbal) with assessment (s) is necessary to enable patient to complete evaluation

## 2018-09-24 NOTE — PROGRESS NOTES
Hospitalist Progress Note Patient: Constantino Cary MRN: [id number]  CSN: [id number] YOB: 1925  Age: 80 y.o. Sex: female DOA: 9/21/2018 LOS:  LOS: 3 days Chief Complaint: 
 
ARF Assessment/Plan 1. ARF 
2. UTI 3. Shoulder and Rip Fracture 4. HTN 
 
1. Will complete workup today with renal ultrasound and urine studies. Continued improvement of her serum Cr. Will also add IVF for hydration and monitor cr with BMP in AM. 
2. Continue Rocephin, culture showing E.coli. 3. Continue PT/OT. No complaints of pain at time of exam.  
4. Continue current regimen, will monitor BP over next day and determine if changes needed prior to discharge. Patient and patient's daughter voiced concern over lack of motivation of patient, no desire to get out of bed, and feeling \"down\" with life events and sickness. Will start low dose Lexapro 10mg while hospitalized. Informed patient and patient's daughter that she will need outpatient follow up for adjustments of dose. DVT Prophylaxis - Heparin Disposition - Rehab, 24 hours. Patient Active Problem List  
Diagnosis Code  UTI (urinary tract infection) N39.0  Sepsis ([address] A41.9  VINNY (acute kidney injury) ([address] N17.9  Rib fracture S22.39XA  Fracture of acromial process L40.333V Subjective: 
 
Feels ok, hopeful to leave tomorrow. Review of systems: 
 
Constitutional: denies fevers, chills, myalgias. +Buttock soreness Respiratory: denies SOB, cough Cardiovascular: denies chest pain, palpitations Gastrointestinal: denies nausea, vomiting, diarrhea Vital signs/Intake and Output: 
Visit Vitals  /50 (BP 1 Location: Right arm, BP Patient Position: Sitting)  Pulse 62  Temp 98.1 °F (36.7 °C)  Resp 18  Ht 5' 1\" (1.549 m)  Wt 79.7 kg (175 lb 9.6 oz)  SpO2 97%  Breastfeeding No  
 BMI 33.18 kg/m2 Current Shift:    
Last three shifts:  09/22 1901 - 09/24 0700 In: 18 [P.O.:480; I.V.:30] Out: - Exam: 
 
General: Elderly white female, alert, NAD, OX3 Head/Neck: NCAT, supple, No masses, No lymphadenopathy CVS:Regular rate and rhythm, no M/R/G, S1/S2 heard, no thrill Lungs:Clear to auscultation bilaterally, no wheezes, rhonchi, or rales Abdomen: Soft, Nontender, No distention, Normal Bowel sounds, No hepatomegaly Extremities: No C/C/E, pulses palpable 2+ Skin:normal texture and turgor, no rashes, no lesions Neuro:grossly normal , follows commands Psych:appropriate Labs: Results:  
   
Chemistry Recent Labs  
   09/24/18 
 0678  09/23/18 
 0235  09/22/18 
 0320 GLU  149*  105*  142* NA  139  138  137  
K  4.1  4.0  3.4*  
CL  102  102  98* CO2  29  29  31 BUN  65*  72*  77* CREA  2.08*  2.12*  2.40* CA  8.7  8.3*  8.3* AGAP  8  7  8 BUCR  31*  34*  32* CBC w/Diff Recent Labs  
   09/24/18 
 0415  09/23/18 
 0235  09/22/18 
 0320 WBC  10.9  12.4  11.7 RBC  4.39  4.31  4.16* HGB  12.8  12.5  12.1 HCT  39.9  39.4  37.7 PLT  271  244  201 GRANS  64  64  68 LYMPH  21  20*  19* EOS  4  4  2 Cardiac Enzymes Recent Labs  
   09/22/18 
 0320 CPK  152 Coagulation No results for input(s): PTP, INR, APTT in the last 72 hours. No lab exists for component: INREXT Lipid Panel No results found for: CHOL, CHOLPOCT, CHOLX, CHLST, CHOLV, 932298, HDL, LDL, LDLC, DLDLP, 174477, VLDLC, VLDL, TGLX, TRIGL, TRIGP, TGLPOCT, CHHD, CHHDX  
BNP No results for input(s): BNPP in the last 72 hours. Liver Enzymes No results for input(s): TP, ALB, TBIL, AP, SGOT, GPT in the last 72 hours. No lab exists for component: DBIL Thyroid Studies No results found for: T4, T3U, TSH, TSHEXT Procedures/imaging: see electronic medical records for all procedures/Xrays and details which were not copied into this note but were reviewed prior to creation of Plan Ari Galan PA-C

## 2018-09-24 NOTE — ROUTINE PROCESS
Bedside and Verbal shift change report given to Jess (oncoming nurse) by Eulalio Braun 
 (offgoing nurse). Report included the following information SBAR, Kardex, Intake/Output and MAR.

## 2018-09-24 NOTE — PROGRESS NOTES
Problem: Mobility Impaired (Adult and Pediatric) Goal: *Acute Goals and Plan of Care (Insert Text) Physical Therapy Goals Initiated 9/22/2018 and to be accomplished within 3-5 day(s) 1. Patient will move from supine <> sit with S in prep for out of bed activity and change of position. 2.  Patient will perform sit<> stand with S with LRAD in prep for transfers/ambulation. 3.  Patient will transfer from bed <> chair with S with LRAD for time up in chair for completion of ADL activity. 4.  Patient will ambulate 150 feet with LRAD/S for improved functional mobility/safe discharge. Outcome: Progressing Towards Goal 
physical Therapy TREATMENT Patient: Luis Alfredo Dietrich (13 y.o. female) Date: 9/24/2018 Diagnosis: Sepsis ([address] Post op infection UTI (urinary tract infection) UTI (urinary tract infection) VINNY (acute kidney injury) ([address] VINNY (acute kidney injury) ([address] Precautions: Fall, Skin, DNR (L acromion process fx) Chart, physical therapy assessment, plan of care and goals were reviewed. ASSESSMENT: 
Pt sitting in chair upon arrival. Family has multiple questions about care and plans for discharge. Pt motivated to participate however c/o tiredness. Pt does present with decrease tolerance to activity and fatigues quickly. assisted pt to restroom 1st. VCs for sequencing. When pt got close to toilet, B knee buckling noted. CGA needed for safety. Pt unable to increase ambulation distance with RW at this time due to fatigue level. Pt remained sitting in chair post tx. Cont POC. Progression toward goals: 
[]      Improving appropriately and progressing toward goals [x]      Improving slowly and progressing toward goals 
[]      Not making progress toward goals and plan of care will be adjusted PLAN: 
Patient continues to benefit from skilled intervention to address the above impairments. Continue treatment per established plan of care. Discharge Recommendations:  Rehab Further Equipment Recommendations for Discharge:  rolling walker SUBJECTIVE:  
Patient stated Delle Crutch OBJECTIVE DATA SUMMARY:  
Critical Behavior: 
Neurologic State: Alert Orientation Level: Oriented X4 Cognition: Decreased attention/concentration, Follows commands Safety/Judgement: Awareness of environment Functional Mobility Training: 
Transfers: 
Sit to Stand: Stand-by assistance Stand to Sit: Stand-by assistance Bed to Chair: Contact guard assistance Balance: 
Sitting: Intact Standing: Intact; With support Ambulation/Gait Training: 
Distance (ft): 40 Feet (ft) Assistive Device: Walker, rolling;Gait belt Ambulation - Level of Assistance: Contact guard assistance Gait Abnormalities: Decreased step clearance Base of Support: Narrowed Speed/Toya: Slow Step Length: Right shortened;Left shortened Swing Pattern: Left asymmetrical;Right asymmetrical 
 
Activity Tolerance:  
Fair After treatment:  
[x] Patient left in no apparent distress sitting up in chair 
[] Patient left in no apparent distress in bed 
[x] Call bell left within reach 
[] Nursing notified 
[] Caregiver present 
[] Bed alarm activated Iliana Rogers PTA Time Calculation: 23 mins

## 2018-09-24 NOTE — PROGRESS NOTES
Problem: Pressure Injury - Risk of 
Goal: *Prevention of pressure injury Document Kaveh Scale and appropriate interventions in the flowsheet. Outcome: Not Progressing Towards Goal 
Pressure Injury Interventions: 
Sensory Interventions: Avoid rigorous massage over bony prominences Moisture Interventions: Check for incontinence Q2 hours and as needed, Contain wound drainage, Limit adult briefs, Maintain skin hydration (lotion/cream) Activity Interventions: Increase time out of bed, Pressure redistribution bed/mattress(bed type) Mobility Interventions: Pressure redistribution bed/mattress (bed type), PT/OT evaluation Nutrition Interventions: Document food/fluid/supplement intake, Offer support with meals,snacks and hydration Friction and Shear Interventions: Lift sheet

## 2018-09-24 NOTE — DIABETES MGMT
NUTRITION / GLYCEMIC CONTROL PLAN OF CARE 
 -known h/o T2DM, HbA1c not within recommended range for age + comorbids on oral home regimen 
-pt reports adherence to Januvia however not sure she is taking glipizide 
-recommend evaluate DM PTA, do believe pt would benefit from weight based dose of Lantus + continuation of [facility] [place] Diabetes Management:  
 - recommend: Both FBG & PPG out of target range recommend initiate basal + bolus regimen; change diet to consistent carb (orders entered per protocol by RD 
 *Lantus 8 units *Humalog 4 units qac *- Humalog Normal Insulin Sensitivity Corrective Coverage 
- education: (see GC RD note) 
- goals: *BG will be in target range of  mg/dl (non-ICU) by 9/30 *PO intake will be 75% of meals offered by 9/30 *Pt will follow up with OP PCP for Diabetes Management by 10/30 
- TDD: 27 units - Humalog Very Insulin Resistant Corrective Coverage - BG range: 149-303 mg/dL - Hypo: no 
- BG in target range (non-ICU: <140; ICU<180): [] Yes  [x] No 
- Steroids: no 
- Intake:  
 Patient Vitals for the past 100 hrs: 
 % Diet Eaten  
09/24/18 1434 100 % 09/23/18 1827 75 %  
09/23/18 1433 75 %  
09/22/18 1239 60 %  
09/22/18 0851 100 % Current Insulin regimen: - Humalog Very Insulin Resistant Corrective Coverage Home medication/insulin regimen: 
Januvia 50 mg daily Glipizide (pt unable to confirm) Recent Glucose Results: Lab Results Component Value Date/Time  (H) 09/24/2018 04:15 AM  
 GLUCPOC 303 (H) 09/24/2018 12:07 PM  
 GLUCPOC 177 (H) 09/24/2018 07:19 AM  
 GLUCPOC 231 (H) 09/23/2018 09:07 PM  
 
 
Adequate glycemic control PTA:  [] Yes  [x] No 
 
HbA1c: equivalent  to ave BGlucose of 232 mg/d  mg/dl for 2-3 months prior to admission Lab Results Component Value Date/Time Hemoglobin A1c 9.7 (H) 09/21/2018 11:44 AM  
 
Diet:  
Active Orders Diet  DIET CARDIAC Regular; Consistent Carb 1800kcal  
 
 
Kendall Llamas MS, RN, CDE 
 Glycemic Control Team 
[phone] Pager [phone] (M-TH 8:30-5P) *After Hours pager [phone]

## 2018-09-25 LAB
ANION GAP SERPL CALC-SCNC: 8 MMOL/L (ref 3–18)
BUN SERPL-MCNC: 57 MG/DL (ref 7–18)
BUN/CREAT SERPL: 29 (ref 12–20)
CALCIUM SERPL-MCNC: 8.3 MG/DL (ref 8.5–10.1)
CHLORIDE SERPL-SCNC: 104 MMOL/L (ref 100–108)
CO2 SERPL-SCNC: 25 MMOL/L (ref 21–32)
CREAT SERPL-MCNC: 1.95 MG/DL (ref 0.6–1.3)
CREAT UR-MCNC: 71.55 MG/DL (ref 30–125)
GLUCOSE BLD STRIP.AUTO-MCNC: 144 MG/DL (ref 70–110)
GLUCOSE BLD STRIP.AUTO-MCNC: 167 MG/DL (ref 70–110)
GLUCOSE BLD STRIP.AUTO-MCNC: 262 MG/DL (ref 70–110)
GLUCOSE SERPL-MCNC: 136 MG/DL (ref 74–99)
OSMOLALITY UR: 368 MOSM/KG H2O (ref 300–1300)
POTASSIUM SERPL-SCNC: 4.3 MMOL/L (ref 3.5–5.5)
SODIUM SERPL-SCNC: 137 MMOL/L (ref 136–145)
SODIUM UR-SCNC: 29 MMOL/L (ref 20–110)

## 2018-09-25 PROCEDURE — 82962 GLUCOSE BLOOD TEST: CPT

## 2018-09-25 PROCEDURE — 82570 ASSAY OF URINE CREATININE: CPT | Performed by: HOSPITALIST

## 2018-09-25 PROCEDURE — 65270000029 HC RM PRIVATE

## 2018-09-25 PROCEDURE — 80048 BASIC METABOLIC PNL TOTAL CA: CPT | Performed by: HOSPITALIST

## 2018-09-25 PROCEDURE — 74011250637 HC RX REV CODE- 250/637: Performed by: PHYSICIAN ASSISTANT

## 2018-09-25 PROCEDURE — 97530 THERAPEUTIC ACTIVITIES: CPT

## 2018-09-25 PROCEDURE — 74011250637 HC RX REV CODE- 250/637: Performed by: INTERNAL MEDICINE

## 2018-09-25 PROCEDURE — 74011636637 HC RX REV CODE- 636/637: Performed by: INTERNAL MEDICINE

## 2018-09-25 PROCEDURE — 97116 GAIT TRAINING THERAPY: CPT

## 2018-09-25 PROCEDURE — 83935 ASSAY OF URINE OSMOLALITY: CPT | Performed by: HOSPITALIST

## 2018-09-25 PROCEDURE — 36415 COLL VENOUS BLD VENIPUNCTURE: CPT | Performed by: HOSPITALIST

## 2018-09-25 PROCEDURE — 74011000250 HC RX REV CODE- 250: Performed by: INTERNAL MEDICINE

## 2018-09-25 PROCEDURE — 74011250636 HC RX REV CODE- 250/636: Performed by: INTERNAL MEDICINE

## 2018-09-25 PROCEDURE — 51798 US URINE CAPACITY MEASURE: CPT

## 2018-09-25 PROCEDURE — 87040 BLOOD CULTURE FOR BACTERIA: CPT | Performed by: PHYSICIAN ASSISTANT

## 2018-09-25 PROCEDURE — 84300 ASSAY OF URINE SODIUM: CPT | Performed by: HOSPITALIST

## 2018-09-25 PROCEDURE — 74011636637 HC RX REV CODE- 636/637: Performed by: PHYSICIAN ASSISTANT

## 2018-09-25 RX ORDER — HYDRALAZINE HYDROCHLORIDE 25 MG/1
25 TABLET, FILM COATED ORAL 3 TIMES DAILY
Qty: 90 TAB | Refills: 0 | Status: SHIPPED
Start: 2018-09-25 | End: 2018-09-26

## 2018-09-25 RX ORDER — PEN NEEDLE, DIABETIC 29 G X1/2"
1 NEEDLE, DISPOSABLE MISCELLANEOUS
Qty: 50 PEN NEEDLE | Refills: 0 | Status: SHIPPED | OUTPATIENT
Start: 2018-09-25

## 2018-09-25 RX ORDER — ESCITALOPRAM OXALATE 10 MG/1
10 TABLET ORAL DAILY
Qty: 30 TAB | Refills: 0 | Status: SHIPPED | OUTPATIENT
Start: 2018-09-26

## 2018-09-25 RX ORDER — CLONIDINE 0.2 MG/24H
1 PATCH, EXTENDED RELEASE TRANSDERMAL
Qty: 1 PATCH | Refills: 0 | Status: SHIPPED | OUTPATIENT
Start: 2018-09-29 | End: 2018-09-26

## 2018-09-25 RX ORDER — INSULIN GLARGINE 100 [IU]/ML
8 INJECTION, SOLUTION SUBCUTANEOUS
Qty: 1 PEN | Refills: 0 | Status: SHIPPED | OUTPATIENT
Start: 2018-09-25

## 2018-09-25 RX ADMIN — ACETAMINOPHEN 650 MG: 325 TABLET ORAL at 22:31

## 2018-09-25 RX ADMIN — INSULIN LISPRO 4 UNITS: 100 INJECTION, SOLUTION INTRAVENOUS; SUBCUTANEOUS at 09:50

## 2018-09-25 RX ADMIN — INSULIN LISPRO 4 UNITS: 100 INJECTION, SOLUTION INTRAVENOUS; SUBCUTANEOUS at 17:40

## 2018-09-25 RX ADMIN — METOPROLOL SUCCINATE 50 MG: 50 TABLET, EXTENDED RELEASE ORAL at 09:48

## 2018-09-25 RX ADMIN — HYDRALAZINE HYDROCHLORIDE 25 MG: 25 TABLET, FILM COATED ORAL at 09:48

## 2018-09-25 RX ADMIN — HEPARIN SODIUM 5000 UNITS: 5000 INJECTION INTRAVENOUS; SUBCUTANEOUS at 17:42

## 2018-09-25 RX ADMIN — INSULIN GLARGINE 8 UNITS: 100 INJECTION, SOLUTION SUBCUTANEOUS at 22:31

## 2018-09-25 RX ADMIN — HEPARIN SODIUM 5000 UNITS: 5000 INJECTION INTRAVENOUS; SUBCUTANEOUS at 02:17

## 2018-09-25 RX ADMIN — INSULIN LISPRO 3 UNITS: 100 INJECTION, SOLUTION INTRAVENOUS; SUBCUTANEOUS at 09:50

## 2018-09-25 RX ADMIN — INSULIN LISPRO 4 UNITS: 100 INJECTION, SOLUTION INTRAVENOUS; SUBCUTANEOUS at 12:30

## 2018-09-25 RX ADMIN — HEPARIN SODIUM 5000 UNITS: 5000 INJECTION INTRAVENOUS; SUBCUTANEOUS at 09:49

## 2018-09-25 RX ADMIN — ACETAMINOPHEN 650 MG: 325 TABLET ORAL at 14:02

## 2018-09-25 RX ADMIN — INSULIN LISPRO 12 UNITS: 100 INJECTION, SOLUTION INTRAVENOUS; SUBCUTANEOUS at 12:30

## 2018-09-25 RX ADMIN — HYDRALAZINE HYDROCHLORIDE 25 MG: 25 TABLET, FILM COATED ORAL at 22:31

## 2018-09-25 RX ADMIN — ACETAMINOPHEN 650 MG: 325 TABLET ORAL at 17:43

## 2018-09-25 RX ADMIN — CEFTRIAXONE 1 G: 1 INJECTION, POWDER, FOR SOLUTION INTRAMUSCULAR; INTRAVENOUS at 17:55

## 2018-09-25 RX ADMIN — INSULIN LISPRO 3 UNITS: 100 INJECTION, SOLUTION INTRAVENOUS; SUBCUTANEOUS at 17:39

## 2018-09-25 RX ADMIN — ACETAMINOPHEN 650 MG: 325 TABLET ORAL at 09:00

## 2018-09-25 RX ADMIN — ESCITALOPRAM OXALATE 10 MG: 10 TABLET ORAL at 09:48

## 2018-09-25 RX ADMIN — SITAGLIPTIN 50 MG: 25 TABLET, FILM COATED ORAL at 09:48

## 2018-09-25 RX ADMIN — HYDRALAZINE HYDROCHLORIDE 25 MG: 25 TABLET, FILM COATED ORAL at 17:43

## 2018-09-25 NOTE — PROGRESS NOTES
Patient was visited by [facility]. Volunteer conducted a Spiritual Care Screening and reported no needs to this . Spiritual Care literature was provided. Chaplains will continue to follow and will provide pastoral care as needed or requested. [address] [facility] M.Div. Board Certified [employer] [phone] - Office

## 2018-09-25 NOTE — PROGRESS NOTES
Requesting to go to bathroom every hour.  Scaned post void of her usual 0 -15 ml of clear jaguar urine with a residual of 21 ml

## 2018-09-25 NOTE — PROGRESS NOTES
Problem: Mobility Impaired (Adult and Pediatric) Goal: *Acute Goals and Plan of Care (Insert Text) Physical Therapy Goals Initiated 9/22/2018 and to be accomplished within 3-5 day(s) 1. Patient will move from supine <> sit with S in prep for out of bed activity and change of position. 2.  Patient will perform sit<> stand with S with LRAD in prep for transfers/ambulation. 3.  Patient will transfer from bed <> chair with S with LRAD for time up in chair for completion of ADL activity. 4.  Patient will ambulate 150 feet with LRAD/S for improved functional mobility/safe discharge. Outcome: Progressing Towards Goal 
physical Therapy TREATMENT Patient: Dheeraj Cameron (75 y.o. female) Date: 9/25/2018 Diagnosis: Sepsis ([address] Post op infection UTI (urinary tract infection) UTI (urinary tract infection) VINNY (acute kidney injury) ([facility] VINNY (acute kidney injury) ([facility] Precautions: Fall, Skin, DNR (L acromion process fx) Chart, physical therapy assessment, plan of care and goals were reviewed. ASSESSMENT: 
Pt showing improved mobility, and increasing ambulation distance with RW. VCs for correct RW management and safety. Pt continues to fatigue quickly and seated rest needed after amb 100'. Pt able amb safely back to room with CGA for safety. No buckling noted to today. Cont POC. Progression toward goals: 
[]      Improving appropriately and progressing toward goals [x]      Improving slowly and progressing toward goals 
[]      Not making progress toward goals and plan of care will be adjusted PLAN: 
Patient continues to benefit from skilled intervention to address the above impairments. Continue treatment per established plan of care. Discharge Recommendations:  Rehab Further Equipment Recommendations for Discharge:  rolling walker SUBJECTIVE:  
Patient stated  I hate the bed   OBJECTIVE DATA SUMMARY:  
Critical Behavior: 
Neurologic State: Alert, Eyes open spontaneously Orientation Level: Oriented X4 Cognition: Follows commands Safety/Judgement: Awareness of environment Functional Mobility Training: 
Transfers: 
Sit to Stand: Contact guard assistance;Stand-by assistance Stand to Sit: Stand-by assistance Balance: 
Sitting: Intact Standing: Intact; With support Ambulation/Gait Training: 
Distance (ft): 100 Feet (ft) (100x2) Assistive Device: Walker, rolling;Gait belt Ambulation - Level of Assistance: Contact guard assistance Gait Abnormalities: Decreased step clearance Base of Support: Narrowed Speed/Toya: Slow Step Length: Right shortened;Left shortened Swing Pattern: Left asymmetrical;Right asymmetrical 
 
Pain: 
Pain Scale 1: Numeric (0 - 10) Pain Intensity 1: 0 Activity Tolerance:  
Fair After treatment:  
[x] Patient left in no apparent distress sitting up in chair 
[] Patient left in no apparent distress in bed 
[x] Call bell left within reach 
[] Nursing notified 
[x] Caregiver present 
[] Bed alarm activated Anya Quinones PTA Time Calculation: 24 mins

## 2018-09-25 NOTE — PROGRESS NOTES
Hospitalist Progress Note Patient: Armin Sheehan MRN: [id number]  CSN: [id number] YOB: 1925  Age: 80 y.o. Sex: female DOA: 9/21/2018 LOS:  LOS: 4 days Chief Complaint: 
 
UTI, VINNY Assessment/Plan 1. VINNY 2. UTI 3. Bacteremia 4. HTN 
5. Shoulder / Rip Fracture 1. Renal ultrasound was unremarkable, continue gentle IV fluids, check BMP tomorrow AM as well. 2. Continue rocephin, culture shows E.coli. 3. Lab called today for GNR in prelim blood culture. Will repeat cultures today. Continue Rocephin at this time. 4. Continue current regimen. 5. Continue PT/OT, no complaints of pain at this time. Continue Lexapro at 10mg. DVT Prophylaxis - Heparin Disposition - Rehab, once repeat blood cultures result. Patient Active Problem List  
Diagnosis Code  UTI (urinary tract infection) N39.0  Sepsis ([address] A41.9  VINNY (acute kidney injury) ([address] N17.9  Rib fracture S22.39XA  Fracture of acromial process H11.005V Subjective: 
 
Is hungry, wants help ordering food. No complaints otherwise. Review of systems: 
 
Constitutional: denies fevers, chills, myalgias Respiratory: denies SOB, cough Cardiovascular: denies chest pain, palpitations Gastrointestinal: denies nausea, vomiting, diarrhea Vital signs/Intake and Output: 
Visit Vitals  /40 (BP 1 Location: Right arm, BP Patient Position: At rest)  Pulse 61  Temp 97.6 °F (36.4 °C)  Resp 18  Ht 5' 1\" (1.549 m)  Wt 76.9 kg (169 lb 8 oz)  SpO2 93%  Breastfeeding No  
 BMI 32.03 kg/m2 Current Shift:    
Last three shifts:  09/23 1901 - 09/25 0700 In: 0616 [P.O.:360; I.V.:935] Out: 50 [Urine:50] Exam: 
 
General: Elderly white female, alert, NAD, OX3 Head/Neck: NCAT, supple, No masses, No lymphadenopathy CVS:Regular rate and rhythm, no M/R/G, S1/S2 heard, no thrill Lungs:Clear to auscultation bilaterally, no wheezes, rhonchi, or rales Abdomen: Soft, Nontender, No distention, Normal Bowel sounds, No hepatomegaly Extremities: No C/C/E, pulses palpable 2+. Venous stasis changes Skin:normal texture and turgor, no rashes, no lesions Neuro:grossly normal , follows commands Psych:appropriate Labs: Results:  
   
Chemistry Recent Labs  
   09/25/18 
 0305  09/24/18 0415 09/23/18 
 0235 GLU  136*  149*  105* NA  137  139  138  
K  4.3  4.1  4.0  
CL  104  102  102 CO2  25  29  29 BUN  57*  65*  72* CREA  1.95*  2.08*  2.12* CA  8.3*  8.7  8.3* AGAP  8  8  7 BUCR  29*  31*  34* CBC w/Diff Recent Labs  
   09/24/18 0415 09/23/18 
 0235 WBC  10.9  12.4 RBC  4.39  4.31  
HGB  12.8  12.5 HCT  39.9  39.4 PLT  271  244 GRANS  64  64 LYMPH  21  20* EOS  4  4 Cardiac Enzymes No results for input(s): CPK, CKND1, AMAN in the last 72 hours. No lab exists for component: Will Denson Coagulation No results for input(s): PTP, INR, APTT in the last 72 hours. No lab exists for component: INREXT Lipid Panel No results found for: CHOL, CHOLPOCT, CHOLX, CHLST, CHOLV, 626298, HDL, LDL, LDLC, DLDLP, 867473, VLDLC, VLDL, TGLX, TRIGL, TRIGP, TGLPOCT, CHHD, CHHDX  
BNP No results for input(s): BNPP in the last 72 hours. Liver Enzymes No results for input(s): TP, ALB, TBIL, AP, SGOT, GPT in the last 72 hours. No lab exists for component: DBIL Thyroid Studies No results found for: T4, T3U, TSH, TSHEXT Procedures/imaging: see electronic medical records for all procedures/Xrays and details which were not copied into this note but were reviewed prior to creation of Plan Ruslan Guevara PA-C

## 2018-09-25 NOTE — PROGRESS NOTES
Shift Summary :  Having frequent bathroom requests with little or no urine. Bladder scanned and results were 21 ml. When explained this to patient, she no longer made many requests/ Slept most of the night following the scan. Awaiting enough urine for sample. Up using walker and doing well. Needs to be reminded to call for assistance.

## 2018-09-25 NOTE — PROGRESS NOTES
Received bedside report from [employer]. Pt AOx4, Tele box #[id number] SR, RA, Bruises Bilateral chins, NS @75ml/hr, pt sitting up in chair at this time, denies pain or discomfort at this time. 1110-Per patient Advocate pt complains of pain rated 5/10 on pain scale. This RN responded to patient and asked if pt needed pain medication, per pt \"No I don't need pain medicine just re-adjustment in chair\". This RN applied pillow under sacrum and pt verbalized sacral area felt better. 1625-Bedside and Verbal shift change report given to Shannen Lara RN (oncoming nurse) by [place] RN(offgoing nurse).  Report included the following information SBAR, Kardex, [facility] and Cardiac Rhythm SR.

## 2018-09-25 NOTE — PROGRESS NOTES
Bedside, Verbal and Written shift change report given to Lilly Arriaga RN (oncoming nurse) by Emily Garcia LPN (offgoing nurse). Report included the following information SBAR, Kardex, Intake/Output, MAR and Recent Results.

## 2018-09-25 NOTE — PROGRESS NOTES
Assist pt to BR via walker. Tolerate well . Pt voided one occurance. Assist pt back to chair. Pt request to walk about 2000. Call bell at side. Denies pain at this time. Dinner tray removed.

## 2018-09-25 NOTE — CDMP QUERY
This patient has been diagnosed with Sepsis. Please document in the progress notes the Clinical Indicators and any associated Acute Organ Dysfunction that supports this diagnosis or state that the diagnosis has been ruled out. Please clarify if this patient is being treated/managed for: 
 
=>Sepsis Ruled out 
=>Sepsis ruled in with VINNY 
=>Other Explanation of clinical findings =>Unable to Determine (no explanation of clinical findings Current documentation: 
Pt admitted after falling on ground. Found to have VINNY, E Coli UTI On admission-Temp=97.5, pulse=75, Resp rate=20 
 
WBC=10.5, Neuts=72, INR=1.1, no lactic acid results, Blood culture-Gm Neg Rods, Urine culture--E. Coli Sepsis  (BSV Approved definition) Documented Source of suspected or confirmed infection as manifested by 2 or more of the following, not easily explained by another co-existing condition: 
Temperature:  >38C (100.9F)   -OR-  <36C  (96.8F) Heart Rate:  > 90 bpm 
Respiratory Rate:  > 20 / min  -OR-  PaCO2 < 32 WBC:  > 12K  -OR- < 4K  -OR- Bands > 10 Explicitly link all related/associated Acute Organ Dysfunction to Sepsis:     
Encephalopathy Sepsis-induced Hypotension (or)  Septic Shock [SBP < 90 (or) MAP <65 (or) SBP drop > 40 points from baseline] Hypoxemia (or)  Acute Respiratory Failure [need for invasive or non-invasive ventilation OR- pO2 < 60 mmHg] Acute Kidney Injury (or) Acute Renal Failure OR- Oliguria [serum Creatinine > 2.0 OR- Urine Output < 0.5ml/kg/hr for 2 hours] Ileus (absent bowel sounds) Coagulopathy  DIC  Thrombocytopenia [Platelet Count < 025,213 OR- INR > 1.5 OR- aPTT >60 sec] Hyperbilirubinemia     [Bilirubin > 2 mg/dL] Hyperlactatemia   [Lactic Acid > 2.0] Critical-Illness Myopathy or Polyneuropathy Severe Sepsis = Sepsis with associated Acute Organ Dysfunction Septic Shock = Refractory hypotension refractory to aggressive volume replacement and often requiring vasopressor therapy like dopamine  -OR-  Lactic Acidosis  [Lactic Acid > 4.0]. Thank you for addressing this matter.  
Coral Bernardo RN CDMP

## 2018-09-25 NOTE — ROUTINE PROCESS
Bedside and Verbal shift change report given to SHERRILL Mcneal RN  (oncoming nurse) by  COLIN Lomax RN  (offgoing nurse). Report included the following information SBAR, Kardex, Recent Results and Med Rec Status.

## 2018-09-26 VITALS
HEART RATE: 65 BPM | DIASTOLIC BLOOD PRESSURE: 50 MMHG | SYSTOLIC BLOOD PRESSURE: 165 MMHG | RESPIRATION RATE: 18 BRPM | BODY MASS INDEX: 32 KG/M2 | OXYGEN SATURATION: 96 % | TEMPERATURE: 98.2 F | WEIGHT: 169.5 LBS | HEIGHT: 61 IN

## 2018-09-26 LAB
ANION GAP SERPL CALC-SCNC: 8 MMOL/L (ref 3–18)
BASOPHILS # BLD: 0.1 K/UL (ref 0–0.1)
BASOPHILS NFR BLD: 0 % (ref 0–2)
BUN SERPL-MCNC: 42 MG/DL (ref 7–18)
BUN/CREAT SERPL: 24 (ref 12–20)
CALCIUM SERPL-MCNC: 8.7 MG/DL (ref 8.5–10.1)
CHLORIDE SERPL-SCNC: 105 MMOL/L (ref 100–108)
CO2 SERPL-SCNC: 28 MMOL/L (ref 21–32)
CREAT SERPL-MCNC: 1.78 MG/DL (ref 0.6–1.3)
DIFFERENTIAL METHOD BLD: NORMAL
EOSINOPHIL # BLD: 0.4 K/UL (ref 0–0.4)
EOSINOPHIL NFR BLD: 4 % (ref 0–5)
ERYTHROCYTE [DISTWIDTH] IN BLOOD BY AUTOMATED COUNT: 13.8 % (ref 11.6–14.5)
GLUCOSE BLD STRIP.AUTO-MCNC: 141 MG/DL (ref 70–110)
GLUCOSE BLD STRIP.AUTO-MCNC: 249 MG/DL (ref 70–110)
GLUCOSE SERPL-MCNC: 207 MG/DL (ref 74–99)
HCT VFR BLD AUTO: 40.7 % (ref 35–45)
HGB BLD-MCNC: 12.6 G/DL (ref 12–16)
LYMPHOCYTES # BLD: 2.4 K/UL (ref 0.9–3.6)
LYMPHOCYTES NFR BLD: 21 % (ref 21–52)
MCH RBC QN AUTO: 28.7 PG (ref 24–34)
MCHC RBC AUTO-ENTMCNC: 31 G/DL (ref 31–37)
MCV RBC AUTO: 92.7 FL (ref 74–97)
MONOCYTES # BLD: 0.7 K/UL (ref 0.05–1.2)
MONOCYTES NFR BLD: 6 % (ref 3–10)
NEUTS SEG # BLD: 7.9 K/UL (ref 1.8–8)
NEUTS SEG NFR BLD: 69 % (ref 40–73)
PLATELET # BLD AUTO: 290 K/UL (ref 135–420)
PMV BLD AUTO: 9.5 FL (ref 9.2–11.8)
POTASSIUM SERPL-SCNC: 4.4 MMOL/L (ref 3.5–5.5)
RBC # BLD AUTO: 4.39 M/UL (ref 4.2–5.3)
SODIUM SERPL-SCNC: 141 MMOL/L (ref 136–145)
WBC # BLD AUTO: 11.5 K/UL (ref 4.6–13.2)

## 2018-09-26 PROCEDURE — 85025 COMPLETE CBC W/AUTO DIFF WBC: CPT | Performed by: PHYSICIAN ASSISTANT

## 2018-09-26 PROCEDURE — 74011250636 HC RX REV CODE- 250/636: Performed by: PHYSICIAN ASSISTANT

## 2018-09-26 PROCEDURE — 74011636637 HC RX REV CODE- 636/637: Performed by: PHYSICIAN ASSISTANT

## 2018-09-26 PROCEDURE — 74011250636 HC RX REV CODE- 250/636: Performed by: INTERNAL MEDICINE

## 2018-09-26 PROCEDURE — 97110 THERAPEUTIC EXERCISES: CPT

## 2018-09-26 PROCEDURE — 74011250637 HC RX REV CODE- 250/637: Performed by: PHYSICIAN ASSISTANT

## 2018-09-26 PROCEDURE — 74011250637 HC RX REV CODE- 250/637: Performed by: INTERNAL MEDICINE

## 2018-09-26 PROCEDURE — 74011250636 HC RX REV CODE- 250/636: Performed by: HOSPITALIST

## 2018-09-26 PROCEDURE — 97116 GAIT TRAINING THERAPY: CPT

## 2018-09-26 PROCEDURE — 74011636637 HC RX REV CODE- 636/637: Performed by: INTERNAL MEDICINE

## 2018-09-26 PROCEDURE — 36415 COLL VENOUS BLD VENIPUNCTURE: CPT | Performed by: PHYSICIAN ASSISTANT

## 2018-09-26 PROCEDURE — 80048 BASIC METABOLIC PNL TOTAL CA: CPT | Performed by: PHYSICIAN ASSISTANT

## 2018-09-26 PROCEDURE — 82962 GLUCOSE BLOOD TEST: CPT

## 2018-09-26 RX ORDER — FUROSEMIDE 10 MG/ML
40 INJECTION INTRAMUSCULAR; INTRAVENOUS ONCE
Status: DISCONTINUED | OUTPATIENT
Start: 2018-09-26 | End: 2018-09-26 | Stop reason: HOSPADM

## 2018-09-26 RX ORDER — CLONIDINE 0.3 MG/24H
1 PATCH, EXTENDED RELEASE TRANSDERMAL
Qty: 4 PATCH | Refills: 0 | Status: SHIPPED | OUTPATIENT
Start: 2018-09-26

## 2018-09-26 RX ORDER — LEVOFLOXACIN 250 MG/1
TABLET ORAL
Qty: 15 TAB | Refills: 0 | Status: SHIPPED
Start: 2018-09-26 | End: 2018-10-10

## 2018-09-26 RX ORDER — HYDRALAZINE HYDROCHLORIDE 50 MG/1
50 TABLET, FILM COATED ORAL 3 TIMES DAILY
Qty: 90 TAB | Refills: 0 | Status: SHIPPED
Start: 2018-09-26

## 2018-09-26 RX ADMIN — METOPROLOL SUCCINATE 50 MG: 50 TABLET, EXTENDED RELEASE ORAL at 09:11

## 2018-09-26 RX ADMIN — HEPARIN SODIUM 5000 UNITS: 5000 INJECTION INTRAVENOUS; SUBCUTANEOUS at 09:11

## 2018-09-26 RX ADMIN — HEPARIN SODIUM 5000 UNITS: 5000 INJECTION INTRAVENOUS; SUBCUTANEOUS at 01:14

## 2018-09-26 RX ADMIN — ACETAMINOPHEN 650 MG: 325 TABLET ORAL at 12:11

## 2018-09-26 RX ADMIN — INSULIN LISPRO 9 UNITS: 100 INJECTION, SOLUTION INTRAVENOUS; SUBCUTANEOUS at 12:12

## 2018-09-26 RX ADMIN — SODIUM CHLORIDE 75 ML/HR: 900 INJECTION, SOLUTION INTRAVENOUS at 01:15

## 2018-09-26 RX ADMIN — INSULIN LISPRO 4 UNITS: 100 INJECTION, SOLUTION INTRAVENOUS; SUBCUTANEOUS at 09:11

## 2018-09-26 RX ADMIN — HYDRALAZINE HYDROCHLORIDE 25 MG: 25 TABLET, FILM COATED ORAL at 09:11

## 2018-09-26 RX ADMIN — SITAGLIPTIN 50 MG: 25 TABLET, FILM COATED ORAL at 09:11

## 2018-09-26 RX ADMIN — ACETAMINOPHEN 650 MG: 325 TABLET ORAL at 09:11

## 2018-09-26 RX ADMIN — INSULIN LISPRO 4 UNITS: 100 INJECTION, SOLUTION INTRAVENOUS; SUBCUTANEOUS at 12:10

## 2018-09-26 RX ADMIN — ESCITALOPRAM OXALATE 10 MG: 10 TABLET ORAL at 09:11

## 2018-09-26 RX ADMIN — MORPHINE SULFATE 2 MG: 2 INJECTION, SOLUTION INTRAMUSCULAR; INTRAVENOUS at 12:11

## 2018-09-26 NOTE — PROGRESS NOTES
Shift summary: Pt A&Ox4, up with assistance, ambulating with walker in the hallways, denies pain, appears to be resting comfortably.

## 2018-09-26 NOTE — ROUTINE PROCESS
Bedside and Verbal shift change report given to DENNY Cherry (oncoming nurse) by COLIN Jarquin, RN (offgoing nurse). Report included the following information SBAR, Kardex, Intake/Output and MAR.

## 2018-09-26 NOTE — PROGRESS NOTES
DC Plan:  Discharge to [facility], once medically stable. ([facility] at [address] [facility]. Report to [phone]. [phone] Chart reviewed. Me with pt at bedside to discuss dc plan. She verbalized understanding. Will contact pts daughter once dc order placed. [address] Discharge order noted. Spoke with pts daughter and made her aware of dc plan. She verbalized understanding. Awaiting dc summary. Name Relation Home Work [address]   [phone] Unit secretary has made medical transport arrangements for [facility]. Isabel @ [facility] aware, she has received dc summary. Pt and pts daughter, Ms. Germaine Boatengpura made aware of transport time.

## 2018-09-26 NOTE — PROGRESS NOTES
Problem: Mobility Impaired (Adult and Pediatric) Goal: *Acute Goals and Plan of Care (Insert Text) Physical Therapy Goals Initiated 9/22/2018 and to be accomplished within 3-5 day(s) 1. Patient will move from supine <> sit with S in prep for out of bed activity and change of position. 2.  Patient will perform sit<> stand with S with LRAD in prep for transfers/ambulation. 3.  Patient will transfer from bed <> chair with S with LRAD for time up in chair for completion of ADL activity. 4.  Patient will ambulate 150 feet with LRAD/S for improved functional mobility/safe discharge. Outcome: Progressing Towards Goal 
physical Therapy TREATMENT Patient: Aaron Winn (46 y.o. female) Date: 9/26/2018 Diagnosis: Sepsis ([address] Post op infection UTI (urinary tract infection) UTI (urinary tract infection) VINNY (acute kidney injury) ([address] VINNY (acute kidney injury) ([address] Precautions: Fall, Skin, DNR (L acromion process fx) Chart, physical therapy assessment, plan of care and goals were reviewed. ASSESSMENT: 
Pt found sitting in bedside chair upon PT arrival.  Pt completed transfers with S/CGA and vc for hand placement on arms of chair rather than pulling up by RW. Pt able to increase amb distance to 120ft, rest, then 110 with RW/CGA. Note slight decreased stance time R LE and lack of full wt shift R in stance; increased difficulty in swing and decreased foot clearance L LE. Pt completed therapeutic ex for balance/strengthening LE's as noted below and tolerated well. Moderate fatigue reported following GT. Pt left sitting up in chair with all needs in reach and nurse Venessa present. No pain reported this session. Pt preparing to discharge to SNF at this time for rehab. Progression toward goals: 
[x]      Improving appropriately and progressing toward goals 
[]      Improving slowly and progressing toward goals []      Not making progress toward goals and plan of care will be adjusted PLAN: 
Patient continues to benefit from skilled intervention to address the above impairments. Continue treatment per established plan of care. Discharge Recommendations:  Gerry Guerin Further Equipment Recommendations for Discharge:  N/A  
 
SUBJECTIVE:  
Patient stated Gustavo Ponce my tail is sore (sometimes but not now). I have good knees because I had knee replacements in my eighties.  OBJECTIVE DATA SUMMARY:  
Critical Behavior: 
Neurologic State: Alert Orientation Level: Oriented X4 Cognition: Follows commands Safety/Judgement: Awareness of environment Functional Mobility Training: 
Transfers: 
Sit to Stand: Supervision;Contact guard assistance Stand to Sit: Supervision;Contact guard assistance Balance: 
Sitting: Intact Standing: Intact; With support Ambulation/Gait Training: 
Distance (ft): 230 Feet (ft) (120ft, rest, then 110ft) Assistive Device: Gait belt;Walker, rolling Ambulation - Level of Assistance: Contact guard assistance Gait Abnormalities: Decreased step clearance Base of Support: Narrowed Stance: Right decreased; Left increased Speed/Toya: Pace decreased (<100 feet/min) Step Length: Right shortened;Left shortened Swing Pattern: Right asymmetrical;Left asymmetrical 
Therapeutic Exercises: for increased balance/strengthening Seated: AP's x 20, LAQ x 10 bilat; Standing at RW: HR's x 10, Long leg swings x 10 bilat Pain: 
Pain Scale 1: Numeric (0 - 10) Pain Intensity 1: 0 Activity Tolerance:  
Good/good- 
Please refer to the flowsheet for vital signs taken during this treatment. After treatment:  
[x] Patient left in no apparent distress sitting up in chair 
[] Patient left in no apparent distress in bed 
[x] Call bell left within reach [x] Nursing notified and present [facility] [] Caregiver present 
[] Bed alarm activated Niall Schmidt PT Time Calculation: 25 mins

## 2018-09-26 NOTE — PROGRESS NOTES
Report called to Olive Denton LPN at the [facility]. Pt has been d/c's and left via stretcher with medical transporters.

## 2018-09-26 NOTE — DISCHARGE SUMMARY
Discharge Summary    Patient: Ron Garcia MRN: [id number]  CSN: [id number]    YOB: 1925  Age: 80 y.o. Sex: female    DOA: 9/21/2018 LOS:  LOS: 5 days   Discharge Date:      Primary Care Provider:  Mandie Koenig MD    Admission Diagnoses: Sepsis [facility])  Post op infection  UTI (urinary tract infection)  UTI (urinary tract infection)  VINNY (acute kidney injury) ([facility]    Discharge Diagnoses:    Problem List as of 9/26/2018  Date Reviewed: 9/21/2018          Codes Class Noted - Resolved    UTI (urinary tract infection) ICD-10-CM: N39.0  ICD-9-CM: 599.0  9/21/2018 - Present        * (Principal)VINNY (acute kidney injury) ([facility] ICD-10-CM: N17.9  ICD-9-CM: 584.9  9/21/2018 - Present        Rib fracture ICD-10-CM: S22.39XA  ICD-9-CM: 807.00  9/21/2018 - Present        Fracture of acromial process ICD-10-CM: N92.070H  ICD-9-CM: 811.01  9/21/2018 - Present        RESOLVED: Post op infection ICD-10-CM: T81. 4XXA  ICD-9-CM: 998.59  9/21/2018 - 9/21/2018              Discharge Medications:     Current Discharge Medication List      START taking these medications    Details   cloNIDine (CATAPRES-TTS-3) 0.3 mg/24 hr 1 Patch by TransDERmal route every seven (7) days. Qty: 4 Patch, Refills: 0      levoFLOXacin (LEVAQUIN) 250 mg tablet Take two tablets by mouth on day one of treatment. Take 1 tablet by mouth for the remaining 13 days. Qty: 15 Tab, Refills: 0      escitalopram oxalate (LEXAPRO) 10 mg tablet Take 1 Tab by mouth daily. Qty: 30 Tab, Refills: 0      insulin glargine (LANTUS SOLOSTAR U-100 INSULIN) 100 unit/mL (3 mL) inpn 8 Units by SubCUTAneous route nightly. Qty: 1 Pen, Refills: 0      Insulin Needles, Disposable, (COMFORT EZ PEN NEEDLES) 29 gauge x 1/2\" ndle 1 Each by Does Not Apply route nightly. Qty: 50 Pen Needle, Refills: 0         CONTINUE these medications which have CHANGED    Details   hydrALAZINE (APRESOLINE) 50 mg tablet Take 1 Tab by mouth three (3) times daily.   Qty: 90 Tab, Refills: 0         CONTINUE these medications which have NOT CHANGED    Details   metoprolol succinate (TOPROL-XL) 100 mg tablet Take 50 mg by mouth daily. atorvastatin (LIPITOR) 10 mg tablet Take  by mouth daily. furosemide (LASIX) 20 mg tablet Take 20 mg by mouth daily. SITagliptin (JANUVIA) 50 mg tablet Take 50 mg by mouth daily. STOP taking these medications       hydroCHLOROthiazide (HYDRODIURIL) 12.5 mg tablet Comments:   Reason for Stopping:         GLIPIZIDE PO Comments:   Reason for Stopping:         cloNIDine (CATAPRES-TTS-2) 0.2 mg/24 hr patch Comments:   Reason for Stopping:               Discharge Condition: Stable    Procedures : None    Consults: None      PHYSICAL EXAM   Visit Vitals    /50 (BP 1 Location: Right arm, BP Patient Position: Sitting)    Pulse 65    Temp 98.2 °F (36.8 °C)    Resp 18    Ht 5' 1\" (1.549 m)    Wt 76.9 kg (169 lb 8 oz)    SpO2 96%    Breastfeeding No    BMI 32.03 kg/m2     General: Elderly white female. Awake, cooperative, no acute distress    HEENT: NC, Atraumatic. PERRLA, EOMI. Anicteric sclerae. Lungs:  CTA Bilaterally. No Wheezing/Rhonchi. +Rales  Heart:  Regular  rhythm,  No murmur, No Rubs, No Gallops  Abdomen: Soft, Non distended, Non tender. +Bowel sounds,   Extremities: No c/c/e. Venous stasis changes bilaterally  Psych:   Not anxious or agitated. Neurologic:  No acute neurological deficits. Admission HPI : Renate Luna is a 80 y.o. female with past medical history significant for Dm2, HTN , OA& hyperlipidemia presents to the ER after sustaining a fall and being unable to get up from the floor and was on the ground x 12 hours. She denies dizziness, light headedness, LOC and her family reports she is instructed to use RW but has not been doing so. She fell on her R side and complains of pain in her chest wall, shoulder and hip. She denies nausea, vomiting or headache.  She reports her pain is 10/10, worse w movement, better w analgesia, assocaited w weakness.     ON presentation to the ER she was afebrile, hypertensive w systolic readings as high as 224, with out hyopxia. Exam revealed TTP R chest wall, and brusiing to posterior aspect of her shoulder. She has stage 2 pressure ulcerations. Labs demonstrate VINNY, Xrays w fx to ribs and acromial process. Hip without fx. Medicine is asked to admit for further management. Hospital Course : This patient was admitted to medical services on September 21, 2018 after a fall at home. Other admission diagnoses include an VINNY, UTI, hypertension, DM2, a nondisplaced rib fracture, a left acromial process fracture, and a stage 2 pressure ulcer of her buttocks. VINNY  The patient was admitted to the medical floor for further care. The patient's renal function was monitored over the course of her hospital stay, and nephrotoxic medications were held at the time of admission. She underwent a renal ultrasound, which was unremarkable. Over the course of her hospitalization, her renal function improved and began to trend back to normal. Prior to discharge, the patient received gentle IVF for further hydration purposes. Her renal function has greatly improved, and she was advised to follow up with her PCP upon discharge for a recheck of her BMP in the coming days. UTI  The patient's urine was suspicious for UTI, and thus was sent for culture. She was started on Rocephin empirically, and her urine culture returned as E.coli which was susceptible to Rocephin. Prior to discharge, we received a call from the lab stating she had gram-negative rods growing in the aerobic bottle of her blood cultures. This was discussed with Dr Reyes Carrion, ID, who stated that as long as the patient was clinically improved from the time of discharge, she could be switched to a PO Fluoroquinolone, renally dosed, to complete a 14 day course of antibiotics.  Repeat blood cultures are sterile thus far.     Hypertension  The patient's hypertension regimen was adjusted, given her acute kidney injury. It was monitored over the course of her stay, requiring some adjustments throughout. She will be discharged today with an increase in her Catapres to 0.3mg/2h patch, and hydralazine 50mg TID. She will continue her usual metoprolol dose upon discharge. She was advised to follow up with her PCP upon discharge. DM2  The patient's diabetic regimen was altered upon admission. She was continued on her Januvia, but was also placed on basal/bolus coverage as well. Her blood sugar had fluctuations throughout her hospitalization, but for the most part remained relatively stable. She will be discharged today on Lantus 8 units at bedtime, and to continue Januvia. She was advised to stop taking her Glipizide. She was also instructed to follow up with her PCP for continued surveillance and management. Rib Fracture / Acromial Process Fracture  Over the course of the patient's hospitalization, she was medicated for pain control with Ultram and Tylenol. Towards the end of her hospitalization, she requested only Tylenol. The patient worked with PT and OT during her stay and their recommendations are for rehab. Case management has assisted in securing rehab placement, where she will be discharged today. Pressure Ulcer  The patient was seen by the wound care team while hospitalized, and their recommendations were to avoid the use of foam dressings and briefs to reduce microclimate and allow inflammation on buttocks to be relieved; apply proshield after each incontinent episode. The patient and her family also voiced concern over the patient feeling \"down due to her illness and life events\" recently. The patient was started on a low dose of Lexapro, and was advised that she will need outpatient follow up to determine efficacy and to titrate dose if needed. The patient and her family verbalized understanding.      The patient will be discharged to SNF today in stable condition. Activity: PT/OT Eval and Treat    Diet: Cardiac Diet and Diabetic Diet    Follow-up: PCP    Disposition: SNF    Minutes spent on discharge: 45       Labs: Results:       Chemistry Recent Labs      09/26/18   1054  09/25/18   0305  09/24/18   0415   GLU  207*  136*  149*   NA  141  137  139   K  4.4  4.3  4.1   CL  105  104  102   CO2  28  25  29   BUN  42*  57*  65*   CREA  1.78*  1.95*  2.08*   CA  8.7  8.3*  8.7   AGAP  8  8  8   BUCR  24*  29*  31*      CBC w/Diff Recent Labs      09/26/18   1054  09/24/18   0415   WBC  11.5  10.9   RBC  4.39  4.39   HGB  12.6  12.8   HCT  40.7  39.9   PLT  290  271   GRANS  69  64   LYMPH  21  21   EOS  4  4      Cardiac Enzymes No results for input(s): CPK, CKND1, AMAN in the last 72 hours. No lab exists for component: CKRMB, TROIP   Coagulation No results for input(s): PTP, INR, APTT in the last 72 hours. No lab exists for component: INREXT, INREXT    Lipid Panel No results found for: CHOL, CHOLPOCT, CHOLX, CHLST, CHOLV, 226276, HDL, LDL, LDLC, DLDLP, 745193, VLDLC, VLDL, TGLX, TRIGL, TRIGP, TGLPOCT, CHHD, CHHDX   BNP No results for input(s): BNPP in the last 72 hours. Liver Enzymes No results for input(s): TP, ALB, TBIL, AP, SGOT, GPT in the last 72 hours. No lab exists for component: DBIL   Thyroid Studies No results found for: T4, T3U, TSH, TSHEXT, TSHEXT         Significant Diagnostic Studies: Xr Shoulder Lt Ap/lat Min 2 V    Result Date: 9/21/2018  Examination: Left shoulder minimum 2 views. HISTORY: Left shoulder pain after fall. FINDINGS: AP internal and rotation projections of the left shoulder as well as Grashey and transscapular views are performed and interpreted without comparison. Osseous alignment is as expected on the provided views. Moderate severity glenohumeral and acromioclavicular joint osteoarthritis is present. Potential nondisplaced acromial fracture noted on the AP views.  Included portions of the left lung and left chest wall demonstrate no acute abnormality. Multilevel left-sided facet joint osteoarthritis noted involving the imaged cervical spine. IMPRESSION: 1. Potential nondisplaced fracture of the left shoulder acromial process. 2. No acute glenohumeral malalignment. 3. Moderately severe glenohumeral and acromioclavicular joint osteoarthritis. Xr Hip Rt W Or Wo Pelv 2-3 Vws    Result Date: 9/21/2018  Examination: Right hip 1-2 views. HISTORY: Fall with right hip pain. Right flank pain. FINDINGS: AP supine projection of the pelvis as well as AP and frog-leg lateral views of the right hip are performed and interpreted without comparison. The ilioischial and iliopectineal lines are maintained. Moderate severity bilateral hip joint osteoarthritis is present. No fracture demonstrated. Partial visualization of advanced lower lumbar spondylosis. IMPRESSION: 1. Moderate severity bilateral hip joint osteoarthritis without evidence of fracture or acute malalignment involving the right hip. Us Retroperitoneum Comp    Result Date: 9/25/2018  EXAM: Ultrasound retroperitoneum complete INDICATION: UTI. Renal failure, acute (kidney injury). History of hypertension. COMPARISON: None. _______________ FINDINGS: Ultrasound was performed of the kidneys and bladder including color Doppler. Right kidney measures 10.0 cm in length, left kidney measures 11.3 cm in length. No hydronephrosis or renal calculus. Mild cortical thinning is present at the upper pole of the right kidney suggesting scarring. At the interpolar right kidney, or a 2.5 x 2.1 cm circumscribed anechoic focus with posterior acoustic enhancement is present compatible with a simple cyst. No solid masses. 2 circumscribed anechoic foci are present within the left kidney with posterior acoustic enhancement, 1.2 x 1.0 cm at the lower pole, exophytic, 2.2 x 2.0 cm at the upper pole. No solid masses.  No filling defects in the urinary bladder. Bilateral ureteral jets were demonstrated in the bladder with color Doppler. The prevoid bladder volume measures 137 cc. Patient was unable to void. _______________     IMPRESSION: 1. No hydronephrosis. Normal renal echotexture. 2. Bilateral simple renal cysts. Xr Ribs Bi W Pa Chest 4 Vs    Result Date: 9/21/2018  Examination: Ribs bilateral with PA chest. HISTORY: Fall, right-sided rib pain. FINDINGS: AP supine projection of the chest as well as AP and oblique views of each ribs are performed and interpreted without comparison. The lungs are mildly underexpanded. No focal pneumonic opacity. No pneumothorax or pleural effusion. Cardiac size is mildly enlarged. Mediastinal contours appear within normal limits. There is a nondisplaced fracture of the anterolateral right seventh rib. No displaced right or left rib fracture is present. Multilevel thoracic and lumbar spondylosis noted. IMPRESSION: 1. Nondisplaced anterolateral right seventh rib fracture. No evidence of displaced rib fracture. 2. Mildly underexpanded lungs without superimposed acute radiographic cardiopulmonary abnormality. No results found for this or any previous visit.         CC: Mandie Koenig MD

## 2018-09-27 LAB
BACTERIA SPEC CULT: ABNORMAL
GRAM STN SPEC: ABNORMAL
GRAM STN SPEC: ABNORMAL
SERVICE CMNT-IMP: ABNORMAL

## 2018-10-01 LAB
BACTERIA SPEC CULT: NORMAL
BACTERIA SPEC CULT: NORMAL
SERVICE CMNT-IMP: NORMAL
SERVICE CMNT-IMP: NORMAL

## 2018-10-14 LAB
ATRIAL RATE: 74 BPM
CALCULATED P AXIS, ECG09: 66 DEGREES
CALCULATED R AXIS, ECG10: -23 DEGREES
CALCULATED T AXIS, ECG11: 73 DEGREES
DIAGNOSIS, 93000: NORMAL
P-R INTERVAL, ECG05: 170 MS
Q-T INTERVAL, ECG07: 406 MS
QRS DURATION, ECG06: 100 MS
QTC CALCULATION (BEZET), ECG08: 450 MS
VENTRICULAR RATE, ECG03: 74 BPM